# Patient Record
(demographics unavailable — no encounter records)

---

## 2024-11-04 NOTE — REASON FOR VISIT
[Patient preference] : as per patient preference [Continuing, patient seen in-person within last 12 months] : Telehealth services are continuing as patient has been seen in-person within last 12 months. [Telehealth (audio & video) - Individual/Group] : This visit was provided via telehealth using real-time 2-way audio visual technology. [Medical Office: ([facility])___] : The provider was located at the medical office in [unfilled]. [Home] : The patient, [unfilled], was located at home, [unfilled], at the time of the visit. [Participant(s) identity verified] : Participant(s) identity verified. [Verbal consent obtained from patient/other participant(s)] : Verbal consent for telehealth/telephonic services obtained from patient/other participant(s) [Patient] : Patient [FreeTextEntry4] : 9:30am

## 2024-11-04 NOTE — PLAN
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  On initial assessment, the differential includes MDD, GRETA with panic attacks, r/o PTSD. Plan to increase Pristiq to 100mg daily, continue trazodone 150mg at bedtime, Klonopin 0.5mg daily as needed (takes 1-2 days per week). Offered therapy referral resources.  On follow up assessments: 11/4/2024: Partial improvement in depressive symptoms with Pristiq increase, plan to increase to 150mg daily. Continue Klonopin 0.5mg daily as needed, trazodone taking 75mg at bedtime lately (half of 150mg). Decreased caffeine and EtOH use during the interval.  Labs: 9/25/2023: A1c 5.7% H, vit B12 serum 361 wnl, chol 223 H, trig 130 wnl, HDL 69 wnl, LDL calc 131 H, TSH 1.20 wnl 4/2/2024: CMP wnl, TSH 1.08 wnl 6/10/2024: CBC wnl, CMP wnl  PLAN -Discussed the diagnosis, treatment, alternatives to recommended treatment, risk Vs benefits of treatment and no treatment and alternative treatments. -Lab/other tests: advised to continue to check annual bloodwork with PCP -Medication: Increase Pristiq to 150mg daily, continue trazodone 75-150mg nightly, Klonopin 0.5mg as needed.  Desvenlafaxine side effects including but not limited to GI side effects, dry mouth, constipation, sweating, increased BP, urinary retention, sedation, headaches, dizziness, serotonin syndrome, hyponatremia, QT prolongation, weight gain, decreased libido, and black box warning of SI in patients younger than 24 were discussed. Trazodone side effects including but not limited to sedation, dry mouth constipation, headache, serotonin syndrome, black box warning increased suicidal thinking, prolonged QT interval, orthostatic hypotension and priapism discussed. Klonopin side effects: Discussed with patient adverse effects of longer term/frequent use of BZD, potential to develop tolerance to the dose effects and develop dependence, and potential for addiction. Advise patient not to drive or operate heavy machinery immediately after taking the medication. Also educated patient about safe use/and keeping meds safely, and to not share medications with family/friends. Advised not to combine BZD with EtOH. -Discussed recommendation for aerobic exercise -Discussed sleep hygiene -Educated patient of importance of remaining abstinent from drugs and alcohol. -Emergency procedures were discussed: pt. educated to call 911 or go to nearest ER for worsening of symptoms/suicidal/homicidal ideation. -Referred for individual psychotherapy to learn coping skills -RTC in 3-4 weeks or earlier as needed -Patient given opportunity to ask questions and their questions were answered and they expressed understanding and agreement with above plan.  ISTOP Reference #: [id number]  Practitioner Count: 0 Pharmacy Count: 0 Current Opioid Prescriptions: 0 Current Benzodiazepine Prescriptions: 0 Current Stimulant Prescriptions: 0   Patient Demographic Information (PDI)     PDI	First Name	Last Name	Birth Date	Gender	Street Address	[facility] A	Paulo Li	03/30/1987	Male	[address]	[facility]	[address] B	Paulo Li	03/30/1987	Male	[address]	[facility]	[address]  Prescription Information    PDI Filter:   PDI	My Rx	Current Rx	Drug Type	Rx Written	Rx Dispensed	Drug	Quantity	Days Supply	Prescriber Name	Prescriber JAMES #	Payment Method	Dispenser B	N	N	O	07/09/2024	07/10/2024	tramadol hcl 50 mg tablet	16	4	MEMO Tay Juan C	[id number]	Insurance	[id number] B	N	N	O	04/16/2024	04/17/2024	tramadol hcl 50 mg tablet	15	3	MEMO Tay Juan C	[id number]	Insurance	[id number] B	N	N	B	03/21/2024	04/02/2024	alprazolam 0.25 mg tablet	30	15	Mabel Fountain	[id number]	Insurance	[id number] B	N	N	B	02/27/2024	02/28/2024	alprazolam 0.25 mg tablet	30	15	Sloan Vaughn	[id number]	[id number] A	N	N	B	11/01/2023	11/09/2023	alprazolam 0.25 mg tablet	30	15	Sloan Vaughn	[id number]	[id number]

## 2024-11-04 NOTE — PHYSICAL EXAM
[None] : none [Cooperative] : cooperative [Depressed] : depressed [Full] : full [Clear] : clear [Linear/Goal Directed] : linear/goal directed [Average] : average [WNL] : within normal limits [de-identified] : anxious, dysphoric though less irritable, congruent with stated mood [FreeTextEntry7] : denies suicidal ideation/intent/plan or non-suicidal self-injurious ideation/intent/plan or homicidal ideation/intent/plan

## 2024-11-04 NOTE — PLAN
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  On initial assessment, the differential includes MDD, GRETA with panic attacks, r/o PTSD. Plan to increase Pristiq to 100mg daily, continue trazodone 150mg at bedtime, Klonopin 0.5mg daily as needed (takes 1-2 days per week). Offered therapy referral resources.  On follow up assessments: 11/4/2024: Partial improvement in depressive symptoms with Pristiq increase, plan to increase to 150mg daily. Continue Klonopin 0.5mg daily as needed, trazodone taking 75mg at bedtime lately (half of 150mg). Decreased caffeine and EtOH use during the interval.  Labs: 9/25/2023: A1c 5.7% H, vit B12 serum 361 wnl, chol 223 H, trig 130 wnl, HDL 69 wnl, LDL calc 131 H, TSH 1.20 wnl 4/2/2024: CMP wnl, TSH 1.08 wnl 6/10/2024: CBC wnl, CMP wnl  PLAN -Discussed the diagnosis, treatment, alternatives to recommended treatment, risk Vs benefits of treatment and no treatment and alternative treatments. -Lab/other tests: advised to continue to check annual bloodwork with PCP -Medication: Increase Pristiq to 150mg daily, continue trazodone 75-150mg nightly, Klonopin 0.5mg as needed.  Desvenlafaxine side effects including but not limited to GI side effects, dry mouth, constipation, sweating, increased BP, urinary retention, sedation, headaches, dizziness, serotonin syndrome, hyponatremia, QT prolongation, weight gain, decreased libido, and black box warning of SI in patients younger than 24 were discussed. Trazodone side effects including but not limited to sedation, dry mouth constipation, headache, serotonin syndrome, black box warning increased suicidal thinking, prolonged QT interval, orthostatic hypotension and priapism discussed. Klonopin side effects: Discussed with patient adverse effects of longer term/frequent use of BZD, potential to develop tolerance to the dose effects and develop dependence, and potential for addiction. Advise patient not to drive or operate heavy machinery immediately after taking the medication. Also educated patient about safe use/and keeping meds safely, and to not share medications with family/friends. Advised not to combine BZD with EtOH. -Discussed recommendation for aerobic exercise -Discussed sleep hygiene -Educated patient of importance of remaining abstinent from drugs and alcohol. -Emergency procedures were discussed: pt. educated to call 911 or go to nearest ER for worsening of symptoms/suicidal/homicidal ideation. -Referred for individual psychotherapy to learn coping skills -RTC in 3-4 weeks or earlier as needed -Patient given opportunity to ask questions and their questions were answered and they expressed understanding and agreement with above plan.  ISTOP Reference #: [id number]  Practitioner Count: 0 Pharmacy Count: 0 Current Opioid Prescriptions: 0 Current Benzodiazepine Prescriptions: 0 Current Stimulant Prescriptions: 0   Patient Demographic Information (PDI)     PDI	First Name	Last Name	Birth Date	Gender	Street Address	[facility] A	Paulo Li	03/30/1987	Male	[address]	[facility]	[address] B	Paulo Li	03/30/1987	Male	[address]	[facility]	[address]  Prescription Information    PDI Filter:   PDI	My Rx	Current Rx	Drug Type	Rx Written	Rx Dispensed	Drug	Quantity	Days Supply	Prescriber Name	Prescriber JAMES #	Payment Method	Dispenser B	N	N	O	07/09/2024	07/10/2024	tramadol hcl 50 mg tablet	16	4	MEMO Tay Juan C	[id number]	Insurance	[id number] B	N	N	O	04/16/2024	04/17/2024	tramadol hcl 50 mg tablet	15	3	MEMO Tay Juan C	[id number]	Insurance	[id number] B	N	N	B	03/21/2024	04/02/2024	alprazolam 0.25 mg tablet	30	15	Mabel Fountain	[id number]	Insurance	[id number] B	N	N	B	02/27/2024	02/28/2024	alprazolam 0.25 mg tablet	30	15	Sloan Vaughn	[id number]	[id number] A	N	N	B	11/01/2023	11/09/2023	alprazolam 0.25 mg tablet	30	15	Sloan Vaughn	[id number]	[id number]

## 2024-11-04 NOTE — PHYSICAL EXAM
[None] : none [Cooperative] : cooperative [Depressed] : depressed [Full] : full [Clear] : clear [Linear/Goal Directed] : linear/goal directed [Average] : average [WNL] : within normal limits [de-identified] : anxious, dysphoric though less irritable, congruent with stated mood [FreeTextEntry7] : denies suicidal ideation/intent/plan or non-suicidal self-injurious ideation/intent/plan or homicidal ideation/intent/plan

## 2024-11-04 NOTE — HISTORY OF PRESENT ILLNESS
[FreeTextEntry1] : Social: working from home today, decreased caffeine and alcohol use. ISTOP reviewed.  Medications: Pristiq 100mg: feels this is a better dose than 50mg but could be better still, plan to increase to 150mg daily dose. Trazodone 150mg: taking half (75mg), last night did not sleep well.  Mood: "better" though still sometimes has negative thoughts Anxiety: decreased, though still present, gets short-tempered Panic attacks: one yesterday, "small little episode" with shaking Motivation: fair Anhedonia: endorses Appetite: good Sleep: trazodone helps, taking 75mg lately. Multiple interruptions once or twice a week. Energy: fair Focus: could be better Guilt/worthlessness: endorses Thoughts of death: endorses thoughts of death without fully formulated plan or intent Thoughts of harming self: denies Thoughts of harming others: denies  EtOH use: 1-2 beers once per week Drug use: denies Tobacco use: denies Caffeine use: 1 cup every 2 days

## 2024-11-04 NOTE — REASON FOR VISIT
[Patient preference] : as per patient preference [Continuing, patient seen in-person within last 12 months] : Telehealth services are continuing as patient has been seen in-person within last 12 months. [Telehealth (audio & video) - Individual/Group] : This visit was provided via telehealth using real-time 2-way audio visual technology. [Medical Office: ([facility])___] : The provider was located at the medical office in [unfilled]. [Home] : The patient, [unfilled], was located at home, [unfilled], at the time of the visit. [Participant(s) identity verified] : Participant(s) identity verified. [Verbal consent obtained from patient/other participant(s)] : Verbal consent for telehealth/telephonic services obtained from patient/other participant(s) [Patient] : Patient [FreeTextEntry4] : 9:30am

## 2024-11-19 NOTE — PLAN
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  On initial assessment, the differential includes MDD, GRETA with panic attacks, r/o PTSD. Plan to increase Pristiq to 100mg daily, continue trazodone 150mg at bedtime, Klonopin 0.5mg daily as needed (takes 1-2 days per week). Offered therapy referral resources.  On follow up assessments: 11/4/2024: Partial improvement in depressive symptoms with Pristiq increase, plan to increase to 150mg daily. Continue Klonopin 0.5mg daily as needed, trazodone taking 75mg at bedtime lately (half of 150mg). Decreased caffeine and EtOH use during the interval. 11/19/2024: discussed possibility to explore an ADHD diagnosis at the next visit. Ongoing depressive symptoms and anxiety that is worse at night, plan to continue Pristiq to 150mg daily (pt declined a dose increase at this time since he wants to give it longer on this dose), continue trazodone 75-150mg nightly, Klonopin 0.5mg as needed.  Labs: 9/25/2023: A1c 5.7% H, vit B12 serum 361 wnl, chol 223 H, trig 130 wnl, HDL 69 wnl, LDL calc 131 H, TSH 1.20 wnl 4/2/2024: CMP wnl, TSH 1.08 wnl 6/10/2024: CBC wnl, CMP wnl  PLAN -Discussed the diagnosis, treatment, alternatives to recommended treatment, risk Vs benefits of treatment and no treatment and alternative treatments. -Lab/other tests: advised to continue to check annual bloodwork with PCP -Medication: continue Pristiq to 150mg daily, continue trazodone 75-150mg nightly, Klonopin 0.5mg as needed.  Desvenlafaxine side effects including but not limited to GI side effects, dry mouth, constipation, sweating, increased BP, urinary retention, sedation, headaches, dizziness, serotonin syndrome, hyponatremia, QT prolongation, weight gain, decreased libido, and black box warning of SI in patients younger than 24 were discussed. Trazodone side effects including but not limited to sedation, dry mouth constipation, headache, serotonin syndrome, black box warning increased suicidal thinking, prolonged QT interval, orthostatic hypotension and priapism discussed. Klonopin side effects: Discussed with patient adverse effects of longer term/frequent use of BZD, potential to develop tolerance to the dose effects and develop dependence, and potential for addiction. Advise patient not to drive or operate heavy machinery immediately after taking the medication. Also educated patient about safe use/and keeping meds safely, and to not share medications with family/friends. Advised not to combine BZD with EtOH. -Discussed recommendation for aerobic exercise -Discussed sleep hygiene -Educated patient of importance of remaining abstinent from drugs and alcohol. -Emergency procedures were discussed: pt. educated to call 911 or go to nearest ER for worsening of symptoms/suicidal/homicidal ideation. -Referred for individual psychotherapy to learn coping skills -RTC in 2 weeks or earlier as needed -Patient given opportunity to ask questions and their questions were answered and they expressed understanding and agreement with above plan.  ISTOP Reference #: [id number]  Practitioner Count: 0 Pharmacy Count: 0 Current Opioid Prescriptions: 0 Current Benzodiazepine Prescriptions: 0 Current Stimulant Prescriptions: 0   Patient Demographic Information (PDI)     PDI	First Name	Last Name	Birth Date	Gender	Street Address	[facility] Code ANJELICA Li	03/30/1987	Male	[address]	[facility]	[address]  Prescription Information    PDI Filter:   PDI	My Rx	Current Rx	Drug Type	Rx Written	Rx Dispensed	Drug	Quantity	Days Supply	Prescriber Name	Prescriber JAMES #	Payment Method	Dispenser A	N	N	O	07/09/2024	07/10/2024	tramadol hcl 50 mg tablet	16	4	MEMO Tay Juan C	[id number]	[id number] A	N	N	O	04/16/2024	04/17/2024	tramadol hcl 50 mg tablet	15	3	MEMO Tay Juan C	[id number]	Insurance	[id number] A	N	N	B	03/21/2024	04/02/2024	alprazolam 0.25 mg tablet	30	15	Mabel Fountain	[id number]	Insurance	[id number] A	N	N	B	02/27/2024	02/28/2024	alprazolam 0.25 mg tablet	30	15	Sloan Vaughn	[id number]	Insurance	[id number]

## 2024-11-19 NOTE — PHYSICAL EXAM
[None] : none [Cooperative] : cooperative [Depressed] : depressed [Anxious] : anxious [Full] : full [Clear] : clear [Linear/Goal Directed] : linear/goal directed [Average] : average [WNL] : within normal limits [de-identified] : less anxious, less dysphoric, congruent with stated mood [FreeTextEntry7] : passive thoughts of death without intent or plan, denies non-suicidal self-injurious ideation/intent/plan or homicidal ideation/intent/plan

## 2024-11-19 NOTE — PLAN
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  On initial assessment, the differential includes MDD, GRETA with panic attacks, r/o PTSD. Plan to increase Pristiq to 100mg daily, continue trazodone 150mg at bedtime, Klonopin 0.5mg daily as needed (takes 1-2 days per week). Offered therapy referral resources.  On follow up assessments: 11/4/2024: Partial improvement in depressive symptoms with Pristiq increase, plan to increase to 150mg daily. Continue Klonopin 0.5mg daily as needed, trazodone taking 75mg at bedtime lately (half of 150mg). Decreased caffeine and EtOH use during the interval. 11/19/2024: discussed possibility to explore an ADHD diagnosis at the next visit. Ongoing depressive symptoms and anxiety that is worse at night, plan to continue Pristiq to 150mg daily (pt declined a dose increase at this time since he wants to give it longer on this dose), continue trazodone 75-150mg nightly, Klonopin 0.5mg as needed.  Labs: 9/25/2023: A1c 5.7% H, vit B12 serum 361 wnl, chol 223 H, trig 130 wnl, HDL 69 wnl, LDL calc 131 H, TSH 1.20 wnl 4/2/2024: CMP wnl, TSH 1.08 wnl 6/10/2024: CBC wnl, CMP wnl  PLAN -Discussed the diagnosis, treatment, alternatives to recommended treatment, risk Vs benefits of treatment and no treatment and alternative treatments. -Lab/other tests: advised to continue to check annual bloodwork with PCP -Medication: continue Pristiq to 150mg daily, continue trazodone 75-150mg nightly, Klonopin 0.5mg as needed.  Desvenlafaxine side effects including but not limited to GI side effects, dry mouth, constipation, sweating, increased BP, urinary retention, sedation, headaches, dizziness, serotonin syndrome, hyponatremia, QT prolongation, weight gain, decreased libido, and black box warning of SI in patients younger than 24 were discussed. Trazodone side effects including but not limited to sedation, dry mouth constipation, headache, serotonin syndrome, black box warning increased suicidal thinking, prolonged QT interval, orthostatic hypotension and priapism discussed. Klonopin side effects: Discussed with patient adverse effects of longer term/frequent use of BZD, potential to develop tolerance to the dose effects and develop dependence, and potential for addiction. Advise patient not to drive or operate heavy machinery immediately after taking the medication. Also educated patient about safe use/and keeping meds safely, and to not share medications with family/friends. Advised not to combine BZD with EtOH. -Discussed recommendation for aerobic exercise -Discussed sleep hygiene -Educated patient of importance of remaining abstinent from drugs and alcohol. -Emergency procedures were discussed: pt. educated to call 911 or go to nearest ER for worsening of symptoms/suicidal/homicidal ideation. -Referred for individual psychotherapy to learn coping skills -RTC in 2 weeks or earlier as needed -Patient given opportunity to ask questions and their questions were answered and they expressed understanding and agreement with above plan.  ISTOP Reference #: [id number]  Practitioner Count: 0 Pharmacy Count: 0 Current Opioid Prescriptions: 0 Current Benzodiazepine Prescriptions: 0 Current Stimulant Prescriptions: 0   Patient Demographic Information (PDI)     PDI	First Name	Last Name	Birth Date	Gender	Street Address	[facility] Code ANJELICA iL	03/30/1987	Male	[address]	[facility]	[address]  Prescription Information    PDI Filter:   PDI	My Rx	Current Rx	Drug Type	Rx Written	Rx Dispensed	Drug	Quantity	Days Supply	Prescriber Name	Prescriber JAMES #	Payment Method	Dispenser A	N	N	O	07/09/2024	07/10/2024	tramadol hcl 50 mg tablet	16	4	MEMO Tay Juan C	[id number]	[id number] A	N	N	O	04/16/2024	04/17/2024	tramadol hcl 50 mg tablet	15	3	MEMO Tay Juan C	[id number]	Insurance	[id number] A	N	N	B	03/21/2024	04/02/2024	alprazolam 0.25 mg tablet	30	15	Mabel Fountain	[id number]	Insurance	[id number] A	N	N	B	02/27/2024	02/28/2024	alprazolam 0.25 mg tablet	30	15	Sloan Vaughn	[id number]	Insurance	[id number]

## 2024-11-19 NOTE — REASON FOR VISIT
[Patient preference] : as per patient preference [Continuing, patient seen in-person within last 12 months] : Telehealth services are continuing as patient has been seen in-person within last 12 months. [Telehealth (audio & video) - Individual/Group] : This visit was provided via telehealth using real-time 2-way audio visual technology. [Participant(s) identity verified] : Participant(s) identity verified. [Verbal consent obtained from patient/other participant(s)] : Verbal consent for telehealth/telephonic services obtained from patient/other participant(s) [Patient] : Patient [Other Location: e.g. Home (Enter Location, City,State)___] : The patient, [unfilled], was located at [unfilled] at the time of the visit. [FreeTextEntry4] : 4:30pm

## 2024-11-19 NOTE — PHYSICAL EXAM
[None] : none [Cooperative] : cooperative [Depressed] : depressed [Anxious] : anxious [Full] : full [Clear] : clear [Linear/Goal Directed] : linear/goal directed [Average] : average [WNL] : within normal limits [de-identified] : less anxious, less dysphoric, congruent with stated mood [FreeTextEntry7] : passive thoughts of death without intent or plan, denies non-suicidal self-injurious ideation/intent/plan or homicidal ideation/intent/plan

## 2024-11-19 NOTE — HISTORY OF PRESENT ILLNESS
[FreeTextEntry1] : Social: Saw a neurologist for memory issues. Has difficulty with focus and concentration. ISTOP reviewed.  Medications: Pristiq 150mg: has not noticed benefit yet, wants to continue on this dose Trazodone 75mg (half of 150mg): Klonopin 0.5mg as needed: took on Sunday.  Mood: "anxious"  Anxiety: worsens at night Panic attacks: one last week Motivation: fair Anhedonia: denies, enjoys watching his kids play Appetite: "fine" Sleep: multiple interruptions Energy: fair Focus: impaired Guilt/worthlessness: endorses Thoughts of death: passive thoughts of death without intent or plan, wants to live for his children. Thoughts of harming self: denies Thoughts of harming others: denies  EtOH use: 4 beers on  Friday Drug use: denies Tobacco use: denies Caffeine use: less than 1 cup every 2 days
[FreeTextEntry1] : Social: Saw a neurologist for memory issues. Has difficulty with focus and concentration. ISTOP reviewed.  Medications: Pristiq 150mg: has not noticed benefit yet, wants to continue on this dose Trazodone 75mg (half of 150mg): Klonopin 0.5mg as needed: took on Sunday.  Mood: "anxious"  Anxiety: worsens at night Panic attacks: one last week Motivation: fair Anhedonia: denies, enjoys watching his kids play Appetite: "fine" Sleep: multiple interruptions Energy: fair Focus: impaired Guilt/worthlessness: endorses Thoughts of death: passive thoughts of death without intent or plan, wants to live for his children. Thoughts of harming self: denies Thoughts of harming others: denies  EtOH use: 4 beers on  Friday Drug use: denies Tobacco use: denies Caffeine use: less than 1 cup every 2 days
Alert-The patient is alert, awake and responds to voice. The patient is oriented to time, place, and person. The triage nurse is able to obtain subjective information.

## 2024-11-27 NOTE — HISTORY OF PRESENT ILLNESS
[de-identified] : 37 year old male with h/o hyperlipidemia, s/p bariatric surgery, Barett's Esoghatis, GERD, anxiety, insomnia presents for annual exam.  plan for revision of left ankle surgery as still having pain issues with penile irritation while having intercourse, started after circumcision.   needs to follow-up with urologist  Has been doing well on Zepbound.  Has noticed decreased cravings. will sánchez to stop medication in near future  Goal weight-145, concerned he will gain weight back after stopping medication as what happened following bariatric surgery  diet- home cooking mostly.  no juices or energy drinks.  craved soda after the bariatric surgery.   exercise- 5x week, mostly elliptical.    , 2 children  employed  non-smoker

## 2024-11-27 NOTE — HEALTH RISK ASSESSMENT
[Fair] : ~his/her~ current health as fair  [Good] : ~his/her~  mood as  good [No falls in past year] : Patient reported no falls in the past year [No] : In the past 12 months have you used drugs other than those required for medical reasons? No [0] : 2) Feeling down, depressed, or hopeless: Not at all (0) [PHQ-2 Negative - No further assessment needed] : PHQ-2 Negative - No further assessment needed [Never] : Never [[id number]] : 0 [Change in mental status noted] : No change in mental status noted [] :  [Fully functional (bathing, dressing, toileting, transferring, walking, feeding)] : Fully functional (bathing, dressing, toileting, transferring, walking, feeding) [Fully functional (using the telephone, shopping, preparing meals, housekeeping, doing laundry, using] : Fully functional and needs no help or supervision to perform IADLs (using the telephone, shopping, preparing meals, housekeeping, doing laundry, using transportation, managing medications and managing finances) [Reports changes in hearing] : Reports no changes in hearing [Reports changes in vision] : Reports no changes in vision

## 2024-11-27 NOTE — ASSESSMENT
The patient's goals for the shift include      The clinical goals for the shift include patient will remain free from falls     [FreeTextEntry1] : //  h/o obesity s/p bariatric surgery, now with increased food cravings, would like o start weight loss meds as concerned about future weight gain.   -continue zepbound look to dc in near future as weight at goal  -A safe and healthy way to lose weight is to eat fewer calories and get regular exercise. You can lose up about 1 pound a week by decreasing the number of calories you eat by 500 calories each day. You can decrease calories by eating smaller portion sizes or by cutting out high-calorie foods. Read labels to find out how many calories are in the foods you eat. You can also burn calories with exercise such as walking, swimming, or biking. You will be more likely to keep weight off if you make these changes part of your lifestyle. -Exercise at least 30 minutes per day on most days of the week. Some examples of exercise include walking, biking, dancing, and swimming. You can also fit in more physical activity by taking the stairs instead of the elevator or parking farther away from stores.  Barretts Esophagus, s/p bariatric surgery -continue PPI  -f/u as per GI  -Don't overeat. Eat five or six small meals each day, instead of several large meals. Don't eat before bedtime. Allow 2 hours to digest your food before lying down. This allows time for the food to pass out of the stomach and into the small intestine, rather than having it back up into the esophagus. Lying down makes digestion difficult and makes heartburn more likely. -Several foods are known to lead to GERD include: Alcohol, particularly red wine, Black pepper, garlic, raw onions, and other spicy foods, chocolate, citrus fruits and products, such as mckenna, oranges and orange juice, coffee and caffeinated drinks, including tea and soda, peppermint, tomatoes -However, unless these foods are causing you heartburn you don't have to avoid them. -Several medications are known lead to GERD include-regular use of aspirin, NSAIDs include ibuprofen, naproxen, Celebrex, Sedatives, Narcotic painkillers, Progesterone, a hormone found in some birth control pills, iron and potassium.  Hyperlipidemia, LDL >100 -continue zocor  -Will check labs  -Advised decrease greasy, fatty foods, increase exercise, fiber intake  -Elevated cholesterol has been linked to increase in cardiovascular even such as heart attack, stroke, peripheral artery disease and death  vitamin b12 def  -continue supplements   vitamin d -continue supplements  Migraine -controlled  -imetrx prn   Anxiety/depression  -f/u as per psych  -continue desvenlafaxine, as directed by psych  -encouraged relaxation, tech, deep breathing exercises, yoga, acupuncture -follow-up with therapist as directed   Insomnia -continue trazadone as discussed  -Go to bed and get up at the same time every day-Do not try to force yourself to sleep. If you can't sleep, get out of bed and try again later. -Have coffee, tea, and other foods that have caffeine only in the morning -Avoid alcohol in the late afternoon, evening, and bedtime -Keep your bedroom dark, cool, quiet, and free of reminders of work or other things that cause you stress -Solve problems you have before you go to bed-Exercise several days a week, but not right before bed -Avoid looking at phones or reading devices ("e-books") that give off light before bed. This can make it harder to fall asleep.  Healthcare Maintenance -Advise Yearly Skin cancer screening with Dermatologist  -Advise Yearly Eye exam with Ophthalmologist -Advise Yearly Dental exam -Educated of the importance of Healthy diet, such as Mediterranean Diet and Exercise, such as walking >20 minutes a day and increasing gradually as tolerated  Immunizations -Flu vaccine  -Covid vaccine  -Discussed shingles vaccine (shingrix), advised to verify with insurance if its covered through medical or prescription plan

## 2024-11-27 NOTE — HEALTH RISK ASSESSMENT
Patient back from xray via [place] with tech.   [Fair] : ~his/her~ current health as fair  [Good] : ~his/her~  mood as  good [No] : In the past 12 months have you used drugs other than those required for medical reasons? No [No falls in past year] : Patient reported no falls in the past year [0] : 2) Feeling down, depressed, or hopeless: Not at all (0) [PHQ-2 Negative - No further assessment needed] : PHQ-2 Negative - No further assessment needed [Never] : Never [[id number]] : 0 [Change in mental status noted] : No change in mental status noted [] :  [Fully functional (bathing, dressing, toileting, transferring, walking, feeding)] : Fully functional (bathing, dressing, toileting, transferring, walking, feeding) [Fully functional (using the telephone, shopping, preparing meals, housekeeping, doing laundry, using] : Fully functional and needs no help or supervision to perform IADLs (using the telephone, shopping, preparing meals, housekeeping, doing laundry, using transportation, managing medications and managing finances) [Reports changes in hearing] : Reports no changes in hearing [Reports changes in vision] : Reports no changes in vision

## 2024-11-27 NOTE — HISTORY OF PRESENT ILLNESS
[de-identified] : 37 year old male with h/o hyperlipidemia, s/p bariatric surgery, Barett's Esoghatis, GERD, anxiety, insomnia presents for annual exam.  plan for revision of left ankle surgery as still having pain issues with penile irritation while having intercourse, started after circumcision.   needs to follow-up with urologist  Has been doing well on Zepbound.  Has noticed decreased cravings. will sánchez to stop medication in near future  Goal weight-145, concerned he will gain weight back after stopping medication as what happened following bariatric surgery  diet- home cooking mostly.  no juices or energy drinks.  craved soda after the bariatric surgery.   exercise- 5x week, mostly elliptical.    , 2 children  employed  non-smoker

## 2024-11-27 NOTE — ASSESSMENT
[FreeTextEntry1] : //  h/o obesity s/p bariatric surgery, now with increased food cravings, would like o start weight loss meds as concerned about future weight gain.   -continue zepbound look to dc in near future as weight at goal  -A safe and healthy way to lose weight is to eat fewer calories and get regular exercise. You can lose up about 1 pound a week by decreasing the number of calories you eat by 500 calories each day. You can decrease calories by eating smaller portion sizes or by cutting out high-calorie foods. Read labels to find out how many calories are in the foods you eat. You can also burn calories with exercise such as walking, swimming, or biking. You will be more likely to keep weight off if you make these changes part of your lifestyle. -Exercise at least 30 minutes per day on most days of the week. Some examples of exercise include walking, biking, dancing, and swimming. You can also fit in more physical activity by taking the stairs instead of the elevator or parking farther away from stores.  Barretts Esophagus, s/p bariatric surgery -continue PPI  -f/u as per GI  -Don't overeat. Eat five or six small meals each day, instead of several large meals. Don't eat before bedtime. Allow 2 hours to digest your food before lying down. This allows time for the food to pass out of the stomach and into the small intestine, rather than having it back up into the esophagus. Lying down makes digestion difficult and makes heartburn more likely. -Several foods are known to lead to GERD include: Alcohol, particularly red wine, Black pepper, garlic, raw onions, and other spicy foods, chocolate, citrus fruits and products, such as mckenna, oranges and orange juice, coffee and caffeinated drinks, including tea and soda, peppermint, tomatoes -However, unless these foods are causing you heartburn you don't have to avoid them. -Several medications are known lead to GERD include-regular use of aspirin, NSAIDs include ibuprofen, naproxen, Celebrex, Sedatives, Narcotic painkillers, Progesterone, a hormone found in some birth control pills, iron and potassium.  Hyperlipidemia, LDL >100 -continue zocor  -Will check labs  -Advised decrease greasy, fatty foods, increase exercise, fiber intake  -Elevated cholesterol has been linked to increase in cardiovascular even such as heart attack, stroke, peripheral artery disease and death  vitamin b12 def  -continue supplements   vitamin d -continue supplements  Migraine -controlled  -imetrx prn   Anxiety/depression  -f/u as per psych  -continue desvenlafaxine, as directed by psych  -encouraged relaxation, tech, deep breathing exercises, yoga, acupuncture -follow-up with therapist as directed   Insomnia -continue trazadone as discussed  -Go to bed and get up at the same time every day-Do not try to force yourself to sleep. If you can't sleep, get out of bed and try again later. -Have coffee, tea, and other foods that have caffeine only in the morning -Avoid alcohol in the late afternoon, evening, and bedtime -Keep your bedroom dark, cool, quiet, and free of reminders of work or other things that cause you stress -Solve problems you have before you go to bed-Exercise several days a week, but not right before bed -Avoid looking at phones or reading devices ("e-books") that give off light before bed. This can make it harder to fall asleep.  Healthcare Maintenance -Advise Yearly Skin cancer screening with Dermatologist  -Advise Yearly Eye exam with Ophthalmologist -Advise Yearly Dental exam -Educated of the importance of Healthy diet, such as Mediterranean Diet and Exercise, such as walking >20 minutes a day and increasing gradually as tolerated  Immunizations -Flu vaccine  -Covid vaccine  -Discussed shingles vaccine (shingrix), advised to verify with insurance if its covered through medical or prescription plan

## 2024-11-27 NOTE — PHYSICAL EXAM
[Normal] : normal rate, regular rhythm, normal S1 and S2 and no murmur heard [Pedal Pulses Present] : the pedal pulses are present [No Edema] : there was no peripheral edema [No Extremity Clubbing/Cyanosis] : no extremity clubbing/cyanosis [Soft] : abdomen soft [Non Tender] : non-tender [Non-distended] : non-distended [No HSM] : no HSM [Penis Abnormality] : normal circumcised penis [Normal Posterior Cervical Nodes] : no posterior cervical lymphadenopathy [Normal Anterior Cervical Nodes] : no anterior cervical lymphadenopathy [No CVA Tenderness] : no CVA  tenderness [No Joint Swelling] : no joint swelling [No Rash] : no rash [No Focal Deficits] : no focal deficits [Normal Affect] : the affect was normal [Normal Mood] : the mood was normal

## 2025-01-29 NOTE — ASSESSMENT
[Patient Optimized for Surgery] : Patient optimized for surgery [No Further Testing Recommended] : no further testing recommended [As per surgery] : as per surgery [FreeTextEntry4] : Patient is moderate candidate undergoing moderate risk procedure.  No absolute contraindication therefore medically optimized and cleared for procedure.

## 2025-01-29 NOTE — HISTORY OF PRESENT ILLNESS
[No Pertinent Cardiac History] : no history of aortic stenosis, atrial fibrillation, coronary artery disease, recent myocardial infarction, or implantable device/pacemaker [(Patient denies any chest pain, claudication, dyspnea on exertion, orthopnea, palpitations or syncope)] : Patient denies any chest pain, claudication, dyspnea on exertion, orthopnea, palpitations or syncope [FreeTextEntry1] : excess skin removal  [FreeTextEntry2] : 2/7/2025 [FreeTextEntry3] : Rommel Murillo  [FreeTextEntry4] : 37 year old male presents for pre-operative clearance.    Overall doing okay.  No acute issues. off zepbound for 2 weeks

## 2025-02-27 NOTE — PLAN
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  On initial assessment, the differential includes MDD, GRETA with panic attacks, r/o PTSD. Plan to increase Pristiq to 100mg daily, continue trazodone 150mg at bedtime, Klonopin 0.5mg daily as needed (takes 1-2 days per week). Offered therapy referral resources.  On follow up assessments: 11/4/2024: Partial improvement in depressive symptoms with Pristiq increase, plan to increase to 150mg daily. Continue Klonopin 0.5mg daily as needed, trazodone taking 75mg at bedtime lately (half of 150mg). Decreased caffeine and EtOH use during the interval. 11/19/2024: discussed possibility to explore an ADHD diagnosis at the next visit. Ongoing depressive symptoms and anxiety that is worse at night, plan to continue Pristiq to 150mg daily (pt declined a dose increase at this time since he wants to give it longer on this dose), continue trazodone 75-150mg nightly, Klonopin 0.5mg as needed. 2/27/2025: Pt's depression and anxiety are significant, suspect that depressive symptoms are contributing to poor focus rather than apparent ADHD though did not formally assess for ADHD on this visit. Pt is motivated for change and interested in therapy so writer offered an internal therapy referral. For now plan to continue Pristiq 150mg daily, trazodone 75mg at bedtime (can take 37.5mg as 1/4 of 150mg pill if coming home late), Klonopin 0.5mg as needed taking on weekends. MI provided on EtOH use cessation to improve sleep quality and mood.  Labs: 9/25/2023: A1c 5.7% H, vit B12 serum 361 wnl, chol 223 H, trig 130 wnl, HDL 69 wnl, LDL calc 131 H, TSH 1.20 wnl 4/2/2024: CMP wnl, TSH 1.08 wnl 6/10/2024: CBC wnl, CMP wnl  PLAN -Discussed the diagnosis, treatment, alternatives to recommended treatment, risk Vs benefits of treatment and no treatment and alternative treatments. -Lab/other tests: advised to continue to check annual bloodwork with PCP -Medication: continue Pristiq to 150mg daily, continue trazodone 37.5mg-150mg nightly, Klonopin 0.5mg as needed.  Desvenlafaxine side effects including but not limited to GI side effects, dry mouth, constipation, sweating, increased BP, urinary retention, sedation, headaches, dizziness, serotonin syndrome, hyponatremia, QT prolongation, weight gain, decreased libido, and black box warning of SI in patients younger than 24 were discussed. Trazodone side effects including but not limited to sedation, dry mouth constipation, headache, serotonin syndrome, black box warning increased suicidal thinking, prolonged QT interval, orthostatic hypotension and priapism discussed. Klonopin side effects: Discussed with patient adverse effects of longer term/frequent use of BZD, potential to develop tolerance to the dose effects and develop dependence, and potential for addiction. Advise patient not to drive or operate heavy machinery immediately after taking the medication. Also educated patient about safe use/and keeping meds safely, and to not share medications with family/friends. Advised not to combine BZD with EtOH. -Discussed recommendation for aerobic exercise -Discussed sleep hygiene -Educated patient of importance of remaining abstinent from drugs and alcohol. -Emergency procedures were discussed: pt. educated to call 911 or go to nearest ER for worsening of symptoms/suicidal/homicidal ideation. -Referred for individual psychotherapy to learn coping skills -Return to clinic in 4 weeks or earlier as needed -Patient given opportunity to ask questions and their questions were answered and they expressed understanding and agreement with above plan.  ISTOP Reference #: [id number]  Practitioner Count: 3 Pharmacy Count: 1 Current Opioid Prescriptions: 0 Current Benzodiazepine Prescriptions: 0 Current Stimulant Prescriptions: 0   Patient Demographic Information (PDI)     PDI	First Name	Last Name	Birth Date	Gender	Street Address	[facility]	Zip Code [place]	03/30/1987	Male	[address]	[facility]	[id number]  Prescription Information    PDI Filter:   PDI	My Rx	Current Rx	Drug Type	Rx Written	Rx Dispensed	Drug	Quantity	Days Supply	Prescriber Name	Prescriber JAMES #	Payment Method	Dispenser A	N	N	O	02/07/2025	02/07/2025	oxycodone hcl (ir) 5 mg tablet	36	5	Shannan Lockett	[id number]	Cash	[id number] A	N	N	O	12/17/2024	12/18/2024	oxycodone-acetaminophen 5-325 mg tab	28	7	Jasvir, MEMO, Jaspal CONNER	[id number]	Insurance	[id number] A	Y	N	B	12/03/2024	12/03/2024	clonazepam 0.5 mg tablet	30	30	Abdirashid Herrera	[id number]	Insurance	[id number] A	N	N	O	07/09/2024	07/10/2024	tramadol hcl 50 mg tablet	16	4	MEMO Tay, Jaspal CONNER	[id number]	Insurance	[id number] A	N	N	O	04/16/2024	04/17/2024	tramadol hcl 50 mg tablet	15	3	MEMO Tay, Jaspal CONNER	[id number]	Insurance	[id number] A	N	N	B	03/21/2024	04/02/2024	alprazolam 0.25 mg tablet	30	15	Mabel Fountain	[id number]	Insurance	[id number] A	N	N	B	02/27/2024	02/28/2024	alprazolam 0.25 mg tablet	30	15	Sloan Vaughn	[id number]	Insurance	[id number]

## 2025-02-27 NOTE — PHYSICAL EXAM
[None] : none [Cooperative] : cooperative [Full] : full [Clear] : clear [Linear/Goal Directed] : linear/goal directed [Average] : average [WNL] : within normal limits [FreeTextEntry8] :  "calm, mellow" [de-identified] : mildly dysphoric, congruent with stated mood [FreeTextEntry7] : passive thoughts of death without intent or plan, denies non-suicidal self-injurious ideation/intent/plan or homicidal ideation/intent/plan

## 2025-02-27 NOTE — PLAN
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  On initial assessment, the differential includes MDD, GRETA with panic attacks, r/o PTSD. Plan to increase Pristiq to 100mg daily, continue trazodone 150mg at bedtime, Klonopin 0.5mg daily as needed (takes 1-2 days per week). Offered therapy referral resources.  On follow up assessments: 11/4/2024: Partial improvement in depressive symptoms with Pristiq increase, plan to increase to 150mg daily. Continue Klonopin 0.5mg daily as needed, trazodone taking 75mg at bedtime lately (half of 150mg). Decreased caffeine and EtOH use during the interval. 11/19/2024: discussed possibility to explore an ADHD diagnosis at the next visit. Ongoing depressive symptoms and anxiety that is worse at night, plan to continue Pristiq to 150mg daily (pt declined a dose increase at this time since he wants to give it longer on this dose), continue trazodone 75-150mg nightly, Klonopin 0.5mg as needed. 2/27/2025: Pt's depression and anxiety are significant, suspect that depressive symptoms are contributing to poor focus rather than apparent ADHD though did not formally assess for ADHD on this visit. Pt is motivated for change and interested in therapy so writer offered an internal therapy referral. For now plan to continue Pristiq 150mg daily, trazodone 75mg at bedtime (can take 37.5mg as 1/4 of 150mg pill if coming home late), Klonopin 0.5mg as needed taking on weekends. MI provided on EtOH use cessation to improve sleep quality and mood.  Labs: 9/25/2023: A1c 5.7% H, vit B12 serum 361 wnl, chol 223 H, trig 130 wnl, HDL 69 wnl, LDL calc 131 H, TSH 1.20 wnl 4/2/2024: CMP wnl, TSH 1.08 wnl 6/10/2024: CBC wnl, CMP wnl  PLAN -Discussed the diagnosis, treatment, alternatives to recommended treatment, risk Vs benefits of treatment and no treatment and alternative treatments. -Lab/other tests: advised to continue to check annual bloodwork with PCP -Medication: continue Pristiq to 150mg daily, continue trazodone 37.5mg-150mg nightly, Klonopin 0.5mg as needed.  Desvenlafaxine side effects including but not limited to GI side effects, dry mouth, constipation, sweating, increased BP, urinary retention, sedation, headaches, dizziness, serotonin syndrome, hyponatremia, QT prolongation, weight gain, decreased libido, and black box warning of SI in patients younger than 24 were discussed. Trazodone side effects including but not limited to sedation, dry mouth constipation, headache, serotonin syndrome, black box warning increased suicidal thinking, prolonged QT interval, orthostatic hypotension and priapism discussed. Klonopin side effects: Discussed with patient adverse effects of longer term/frequent use of BZD, potential to develop tolerance to the dose effects and develop dependence, and potential for addiction. Advise patient not to drive or operate heavy machinery immediately after taking the medication. Also educated patient about safe use/and keeping meds safely, and to not share medications with family/friends. Advised not to combine BZD with EtOH. -Discussed recommendation for aerobic exercise -Discussed sleep hygiene -Educated patient of importance of remaining abstinent from drugs and alcohol. -Emergency procedures were discussed: pt. educated to call 911 or go to nearest ER for worsening of symptoms/suicidal/homicidal ideation. -Referred for individual psychotherapy to learn coping skills -Return to clinic in 4 weeks or earlier as needed -Patient given opportunity to ask questions and their questions were answered and they expressed understanding and agreement with above plan.  ISTOP Reference #: [id number]  Practitioner Count: 3 Pharmacy Count: 1 Current Opioid Prescriptions: 0 Current Benzodiazepine Prescriptions: 0 Current Stimulant Prescriptions: 0   Patient Demographic Information (PDI)     PDI	First Name	Last Name	Birth Date	Gender	Street Address	[facility]	Zip Code [place] Li	03/30/1987	Male	[address]	[place]	[address]  Prescription Information    PDI Filter:   PDI	My Rx	Current Rx	Drug Type	Rx Written	Rx Dispensed	Drug	Quantity	Days Supply	Prescriber Name	Prescriber JAMES #	Payment Method	Dispenser A	N	N	O	02/07/2025	02/07/2025	oxycodone hcl (ir) 5 mg tablet	36	5	Shannan Lockett	[id number]	Cash	[id number] A	N	N	O	12/17/2024	12/18/2024	oxycodone-acetaminophen 5-325 mg tab	28	7	Jasvir, MEMO, Jaspal CONNER	[id number]	Insurance	[id number] A	Y	N	B	12/03/2024	12/03/2024	clonazepam 0.5 mg tablet	30	30	Abdirashid Herrera	[id number]	Insurance	[id number] A	N	N	O	07/09/2024	07/10/2024	tramadol hcl 50 mg tablet	16	4	MEMO Tay, Jaspal CONNER	[id number]	Insurance	[id number] A	N	N	O	04/16/2024	04/17/2024	tramadol hcl 50 mg tablet	15	3	MEMO Tay, Jaspal CONNER	[id number]	Insurance	[id number] A	N	N	B	03/21/2024	04/02/2024	alprazolam 0.25 mg tablet	30	15	Mabel Fountain	[id number]	Insurance	[id number] A	N	N	B	02/27/2024	02/28/2024	alprazolam 0.25 mg tablet	30	15	Sloan Vaughn	[id number]	Insurance	[id number]

## 2025-02-27 NOTE — PHYSICAL EXAM
[None] : none [Cooperative] : cooperative [Full] : full [Clear] : clear [Linear/Goal Directed] : linear/goal directed [Average] : average [WNL] : within normal limits [FreeTextEntry8] :  "calm, mellow" [de-identified] : mildly dysphoric, congruent with stated mood [FreeTextEntry7] : passive thoughts of death without intent or plan, denies non-suicidal self-injurious ideation/intent/plan or homicidal ideation/intent/plan

## 2025-02-27 NOTE — HISTORY OF PRESENT ILLNESS
[FreeTextEntry1] : Social: Pt still feels poorly when he gets home. He has two drinks every other night or so then gets home but wants to be home after his family is asleep. He realizes that coming home late and being tired impacts him the next day, but he keeps doing it. ISTOP reviewed.  Medications: Pristiq 150mg: endorses adherence, no side effects elicited Trazodone 75mg: taking 3-4 nights a week because coming home late and does not want to take it too late. Discussed taking 1/4 on these nights for 37.5mg instead. Klonopin 0.5mg as needed: takes on weekends, Sat and Sunday  Mood: "calm, mellow" Anxiety: worse at night and weekends Panic attacks: endorses, on weekends Motivation: low Anhedonia: endorses Appetite: ok Sleep: poor, hard to fall asleep Energy: low Focus: low Guilt/worthlessness: endorses, feels like burden on his wife Thoughts of death: passive thoughts of death without intent or plan. Does not want to kill himself. Thoughts of harming self: denies Thoughts of harming others: denies  EtOH use: drinking 2 drinks 3-4 nights a week Drug use: denies Tobacco use: denies Caffeine use: less than 1 cup of decaff a day, almost stopped

## 2025-03-27 NOTE — PLAN
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  On initial assessment, the differential includes MDD, GRETA with panic attacks, r/o PTSD. Plan to increase Pristiq to 100mg daily, continue trazodone 150mg at bedtime, Klonopin 0.5mg daily as needed (takes 1-2 days per week). Offered therapy referral resources.  On follow up assessments: 11/4/2024: Partial improvement in depressive symptoms with Pristiq increase, plan to increase to 150mg daily. Continue Klonopin 0.5mg daily as needed, trazodone taking 75mg at bedtime lately (half of 150mg). Decreased caffeine and EtOH use during the interval. 11/19/2024: discussed possibility to explore an ADHD diagnosis at the next visit. Ongoing depressive symptoms and anxiety that is worse at night, plan to continue Pristiq to 150mg daily (pt declined a dose increase at this time since he wants to give it longer on this dose), continue trazodone 75-150mg nightly, Klonopin 0.5mg as needed. 2/27/2025: Pt's depression and anxiety are significant, suspect that depressive symptoms are contributing to poor focus rather than apparent ADHD though did not formally assess for ADHD on this visit. Pt is motivated for change and interested in therapy so writer offered an internal therapy referral. For now plan to continue Pristiq 150mg daily, trazodone 75mg at bedtime (can take 37.5mg as 1/4 of 150mg pill if coming home late), Klonopin 0.5mg as needed taking on weekends. MI provided on EtOH use cessation to improve sleep quality and mood. 3/27/2025: Pt has had trouble connecting with a therapist, writer discussed the possibility of pt reaching out to connect with the [facility] CBT practice. Continue Pristiq 150mg daily, trazodone 75mg at bedtime (can take 37.5mg as 1/4 of 150mg pill if coming home late), Klonopin 0.5mg as needed taking on weekends. Pt said that he would schedule a 30 min appt with writer to explore a possible ADHD diagnosis at the next visit.  Labs: 9/25/2023: A1c 5.7% H, vit B12 serum 361 wnl, chol 223 H, trig 130 wnl, HDL 69 wnl, LDL calc 131 H, TSH 1.20 wnl 4/2/2024: CMP wnl, TSH 1.08 wnl 6/10/2024: CBC wnl, CMP wnl  PLAN -Discussed the diagnosis, treatment, alternatives to recommended treatment, risk Vs benefits of treatment and no treatment and alternative treatments. -Lab/other tests: advised to continue to check annual bloodwork with PCP -Medication: continue Pristiq to 150mg daily, continue trazodone 37.5mg-150mg nightly, Klonopin 0.5mg as needed.  Desvenlafaxine side effects including but not limited to GI side effects, dry mouth, constipation, sweating, increased BP, urinary retention, sedation, headaches, dizziness, serotonin syndrome, hyponatremia, QT prolongation, weight gain, decreased libido, and black box warning of SI in patients younger than 24 were discussed. Trazodone side effects including but not limited to sedation, dry mouth constipation, headache, serotonin syndrome, black box warning increased suicidal thinking, prolonged QT interval, orthostatic hypotension and priapism discussed. Klonopin side effects: Discussed with patient adverse effects of longer term/frequent use of BZD, potential to develop tolerance to the dose effects and develop dependence, and potential for addiction. Advise patient not to drive or operate heavy machinery immediately after taking the medication. Also educated patient about safe use/and keeping meds safely, and to not share medications with family/friends. Advised not to combine BZD with EtOH. -Discussed recommendation for aerobic exercise -Discussed sleep hygiene -Educated patient of importance of remaining abstinent from drugs and alcohol. -Emergency procedures were discussed: pt. educated to call 911 or go to nearest ER for worsening of symptoms/suicidal/homicidal ideation. -Referred for individual psychotherapy to learn coping skills -Return to clinic in 2-4 weeks or earlier as needed -Patient given opportunity to ask questions and their questions were answered and they expressed understanding and agreement with above plan.  ISTOP Reference #: [id number]  Practitioner Count: 2 Pharmacy Count: 1 Current Opioid Prescriptions: 0 Current Benzodiazepine Prescriptions: 0 Current Stimulant Prescriptions: 0   Patient Demographic Information (PDI)     PDI	First Name	Last Name	Birth Date	Gender	Street Address	[facility]	Zip Code [place]	03/30/1987	Male	[address]	[facility]	[address]  Prescription Information    PDI Filter:   PDI	My Rx	Current Rx	Drug Type	Rx Written	Rx Dispensed	Drug	Quantity	Days Supply	Prescriber Name	Prescriber JAMES #	Payment Method	Dispenser A	N	N	O	02/26/2025	02/27/2025	oxycodone-acetaminophen 5-325 mg tab	5	2	Rommel Chucky H	[id number]	Cash	[id number] A	N	N	O	02/07/2025	02/07/2025	oxycodone hcl (ir) 5 mg tablet	36	5	Shannan Lockett	[id number]	Cash	[id number] A	N	N	O	12/17/2024	12/18/2024	oxycodone-acetaminophen 5-325 mg tab	28	7	MEMO Tay, Jaspal CONNER	[id number]	Insurance	[id number] A	Y	N	B	12/03/2024	12/03/2024	clonazepam 0.5 mg tablet	30	30	Abdirashid Herrera	[id number]	Insurance	[id number] A	N	N	O	07/09/2024	07/10/2024	tramadol hcl 50 mg tablet	16	4	MEMO Tay Juan C	[id number]	Insurance	[id number] A	N	N	O	04/16/2024	04/17/2024	tramadol hcl 50 mg tablet	15	3	MEMO Tay Juan C	[id number]	Insurance	[id number] A	N	N	B	03/21/2024	04/02/2024	alprazolam 0.25 mg tablet	30	15	Mabel Fountain	[id number]	Insurance	[id number]

## 2025-03-27 NOTE — PHYSICAL EXAM
[None] : none [Cooperative] : cooperative [Euthymic] : euthymic [Full] : full [Clear] : clear [Linear/Goal Directed] : linear/goal directed [Average] : average [WNL] : within normal limits [de-identified] : less dysphoric, congruent with stated mood [FreeTextEntry7] : passive thoughts of death without intent or plan, denies non-suicidal self-injurious ideation/intent/plan or homicidal ideation/intent/plan

## 2025-03-27 NOTE — PLAN
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  On initial assessment, the differential includes MDD, GRETA with panic attacks, r/o PTSD. Plan to increase Pristiq to 100mg daily, continue trazodone 150mg at bedtime, Klonopin 0.5mg daily as needed (takes 1-2 days per week). Offered therapy referral resources.  On follow up assessments: 11/4/2024: Partial improvement in depressive symptoms with Pristiq increase, plan to increase to 150mg daily. Continue Klonopin 0.5mg daily as needed, trazodone taking 75mg at bedtime lately (half of 150mg). Decreased caffeine and EtOH use during the interval. 11/19/2024: discussed possibility to explore an ADHD diagnosis at the next visit. Ongoing depressive symptoms and anxiety that is worse at night, plan to continue Pristiq to 150mg daily (pt declined a dose increase at this time since he wants to give it longer on this dose), continue trazodone 75-150mg nightly, Klonopin 0.5mg as needed. 2/27/2025: Pt's depression and anxiety are significant, suspect that depressive symptoms are contributing to poor focus rather than apparent ADHD though did not formally assess for ADHD on this visit. Pt is motivated for change and interested in therapy so writer offered an internal therapy referral. For now plan to continue Pristiq 150mg daily, trazodone 75mg at bedtime (can take 37.5mg as 1/4 of 150mg pill if coming home late), Klonopin 0.5mg as needed taking on weekends. MI provided on EtOH use cessation to improve sleep quality and mood. 3/27/2025: Pt has had trouble connecting with a therapist, writer discussed the possibility of pt reaching out to connect with the [facility] CBT practice. Continue Pristiq 150mg daily, trazodone 75mg at bedtime (can take 37.5mg as 1/4 of 150mg pill if coming home late), Klonopin 0.5mg as needed taking on weekends. Pt said that he would schedule a 30 min appt with writer to explore a possible ADHD diagnosis at the next visit.  Labs: 9/25/2023: A1c 5.7% H, vit B12 serum 361 wnl, chol 223 H, trig 130 wnl, HDL 69 wnl, LDL calc 131 H, TSH 1.20 wnl 4/2/2024: CMP wnl, TSH 1.08 wnl 6/10/2024: CBC wnl, CMP wnl  PLAN -Discussed the diagnosis, treatment, alternatives to recommended treatment, risk Vs benefits of treatment and no treatment and alternative treatments. -Lab/other tests: advised to continue to check annual bloodwork with PCP -Medication: continue Pristiq to 150mg daily, continue trazodone 37.5mg-150mg nightly, Klonopin 0.5mg as needed.  Desvenlafaxine side effects including but not limited to GI side effects, dry mouth, constipation, sweating, increased BP, urinary retention, sedation, headaches, dizziness, serotonin syndrome, hyponatremia, QT prolongation, weight gain, decreased libido, and black box warning of SI in patients younger than 24 were discussed. Trazodone side effects including but not limited to sedation, dry mouth constipation, headache, serotonin syndrome, black box warning increased suicidal thinking, prolonged QT interval, orthostatic hypotension and priapism discussed. Klonopin side effects: Discussed with patient adverse effects of longer term/frequent use of BZD, potential to develop tolerance to the dose effects and develop dependence, and potential for addiction. Advise patient not to drive or operate heavy machinery immediately after taking the medication. Also educated patient about safe use/and keeping meds safely, and to not share medications with family/friends. Advised not to combine BZD with EtOH. -Discussed recommendation for aerobic exercise -Discussed sleep hygiene -Educated patient of importance of remaining abstinent from drugs and alcohol. -Emergency procedures were discussed: pt. educated to call 911 or go to nearest ER for worsening of symptoms/suicidal/homicidal ideation. -Referred for individual psychotherapy to learn coping skills -Return to clinic in 2-4 weeks or earlier as needed -Patient given opportunity to ask questions and their questions were answered and they expressed understanding and agreement with above plan.  ISTOP Reference #: [id number]  Practitioner Count: 2 Pharmacy Count: 1 Current Opioid Prescriptions: 0 Current Benzodiazepine Prescriptions: 0 Current Stimulant Prescriptions: 0   Patient Demographic Information (PDI)     PDI	First Name	Last Name	Birth Date	Gender	Street Address	[facility]	Zip Code [place]	03/30/1987	Male	[address]	[facility]	[address]  Prescription Information    PDI Filter:   PDI	My Rx	Current Rx	Drug Type	Rx Written	Rx Dispensed	Drug	Quantity	Days Supply	Prescriber Name	Prescriber JAMES #	Payment Method	Dispenser A	N	N	O	02/26/2025	02/27/2025	oxycodone-acetaminophen 5-325 mg tab	5	2	Rommel Chucky H	[id number]	Cash	[id number] A	N	N	O	02/07/2025	02/07/2025	oxycodone hcl (ir) 5 mg tablet	36	5	Shannan Lockett	[id number]	Cash	[id number] A	N	N	O	12/17/2024	12/18/2024	oxycodone-acetaminophen 5-325 mg tab	28	7	MEOM Tay, Jaspal CONNER	[id number]	Insurance	[id number] A	Y	N	B	12/03/2024	12/03/2024	clonazepam 0.5 mg tablet	30	30	Abdirashid Herrera	[id number]	Insurance	[id number] A	N	N	O	07/09/2024	07/10/2024	tramadol hcl 50 mg tablet	16	4	MEMO Tay Juan C	[id number]	Insurance	[id number] A	N	N	O	04/16/2024	04/17/2024	tramadol hcl 50 mg tablet	15	3	MEMO Tay Juan C	[id number]	Insurance	[id number] A	N	N	B	03/21/2024	04/02/2024	alprazolam 0.25 mg tablet	30	15	Mabel Fountain	[id number]	Insurance	[id number]

## 2025-03-27 NOTE — REASON FOR VISIT
[Patient preference] : as per patient preference [Continuing, patient seen in-person within last 12 months] : Telehealth services are continuing as patient has been seen in-person within last 12 months. [Telehealth (audio & video) - Individual/Group] : This visit was provided via telehealth using real-time 2-way audio visual technology. [Medical Office: ([facility])___] : The provider was located at the medical office in [unfilled]. [Home] : The patient, [unfilled], was located at home, [unfilled], at the time of the visit. [Participant(s) identity verified] : Participant(s) identity verified. [Verbal consent obtained from patient/other participant(s)] : Verbal consent for telehealth/telephonic services obtained from patient/other participant(s) [Patient] : Patient [Other Location: e.g. Home (Enter Location, City,State)___] : The patient, [unfilled], was located at [unfilled] at the time of the visit. [FreeTextEntry4] : 9:30am

## 2025-03-27 NOTE — REASON FOR VISIT
[Patient preference] : as per patient preference [Continuing, patient seen in-person within last 12 months] : Telehealth services are continuing as patient has been seen in-person within last 12 months. [Telehealth (audio & video) - Individual/Group] : This visit was provided via telehealth using real-time 2-way audio visual technology. [Medical Office: ([facility])___] : The provider was located at the medical office in [unfilled]. [Home] : The patient, [unfilled], was located at home, [unfilled], at the time of the visit. [Participant(s) identity verified] : Participant(s) identity verified. [Verbal consent obtained from patient/other participant(s)] : Verbal consent for telehealth/telephonic services obtained from patient/other participant(s) [Patient] : Patient [Other Location: e.g. Home (Enter Location, City,State)___] : The patient, [unfilled], was located at [unfilled] at the time of the visit. [FreeTextEntry4] : 9:30am

## 2025-03-27 NOTE — HISTORY OF PRESENT ILLNESS
[FreeTextEntry1] : Social: Pt is still looking for a therapist. He wants to work on depression and anxiety. He is working in [employer] right now. Working on being home more often. Pt's 3 year old son had aggressive behavior at school so pt now has stress of maybe needing to change schools for his son. Pt can now go back to the gym. ISTOP reviewed.   Medications: Pristiq 150mg: endorses adherence, no side effects elicited  Trazodone 37.5mg (half of 75mg): taking 37.5mg-75mg, feels it helps Klonopin 0.5mg as needed: taking Sat/Sundays on weekends  Mood: "good" Anxiety: endorses Panic attacks: a couple last week Motivation: improving Anhedonia: denies Appetite: ok Sleep: "better" Energy: could be better Focus: could be better Guilt/worthlessness: endorses guilt Thoughts of death: ongoing passive thoughts of death without intent or plan Thoughts of harming self: denies Thoughts of harming others: denies  EtOH use: 2 beers 3-4 nights per week, feels he is able to decrease Drug use: denies Tobacco use: denies Caffeine use: 1-2 times a week has cain

## 2025-03-27 NOTE — PHYSICAL EXAM
[None] : none [Cooperative] : cooperative [Euthymic] : euthymic [Full] : full [Clear] : clear [Linear/Goal Directed] : linear/goal directed [Average] : average [WNL] : within normal limits [de-identified] : less dysphoric, congruent with stated mood [FreeTextEntry7] : passive thoughts of death without intent or plan, denies non-suicidal self-injurious ideation/intent/plan or homicidal ideation/intent/plan

## 2025-03-27 NOTE — HISTORY OF PRESENT ILLNESS
[FreeTextEntry1] : Social: Pt is still looking for a therapist. He wants to work on depression and anxiety. He is working in [employer] right now. Working on being home more often. Pt's 3 year old son had aggressive behavior at school so pt now has stress of maybe needing to change schools for his son. Pt can now go back to the gym. ISTOP reviewed.   Medications: Pristiq 150mg: endorses adherence, no side effects elicited  Trazodone 37.5mg (half of 75mg): taking 37.5mg-75mg, feels it helps Klonopin 0.5mg as needed: taking Sat/Sundays on weekends  Mood: "good" Anxiety: endorses Panic attacks: a couple last week Motivation: improving Anhedonia: denies Appetite: ok Sleep: "better" Energy: could be better Focus: could be better Guilt/worthlessness: endorses guilt Thoughts of death: ongoing passive thoughts of death without intent or plan Thoughts of harming self: denies Thoughts of harming others: denies  EtOH use: 2 beers 3-4 nights per week, feels he is able to decrease Drug use: denies Tobacco use: denies Caffeine use: 1-2 times a week has cain

## 2025-04-10 NOTE — PHYSICAL EXAM
[None] : none [Cooperative] : cooperative [Euthymic] : euthymic [Full] : full [Clear] : clear [Linear/Goal Directed] : linear/goal directed [Average] : average [WNL] : within normal limits [de-identified] : less dysphoric, congruent with stated mood [FreeTextEntry7] : denies current thoughts of death, denies non-suicidal self-injurious ideation/intent/plan or homicidal ideation/intent/plan

## 2025-04-10 NOTE — HISTORY OF PRESENT ILLNESS
Pt refused HS Lantus, states that when he takes lantus he has been dropping really low in the 30s or 40s. Education on purpose of lantus and informing MD so that dose can be accurate. MD notified. Will continue to monitor care. [FreeTextEntry1] : Social: Work is busy, is a good distraction, likes this. Stress of wife hoarding, clutter. ISTOP reviewed. CBT Practice: Pt is still looking to connect with a therapist.  Medications: Pristiq 150mg: endorses adherence, no side effects elicited, feels that he is doing better. Trazodone 37.5mg-75mg: Friday to Sunday takes 75mg, on workdays takes 37.5mg. Klonopin 0.5mg as needed: taking Saturdays/Sundays, takes in the mornings on weekends Discussed option to start a non-stimulant vs stimulant medication, pt expressed a preference to start a non-stimulant medication. Plan to start atomoxetine 40mg daily for ADHD.  Mood: "better", can feel more down as he approaches the time he needs to go home Anxiety: improving, worsens right before he needs to go home or mornings on weekends. Panic attacks: on weekends, once a week or so. Motivation: fair Anhedonia: denies Appetite: good Sleep: "better" Energy: good, could be better Focus: could be better Guilt/worthlessness: reducing Thoughts of death: once a week when having panic attacks has passive thoughts of death without intent or plan , denies currently Thoughts of harming self: denies Thoughts of harming others: denies  EtOH use: 2 beers 3-4 nights per week, feels he is able to decrease Drug use: denies Tobacco use: denies Caffeine use: 1-2 times a week has decaf.  Prior ADHD diagnosis: no prior diagnosis, pt's parents were "old school" and did not consider ADHD.  Symptoms of ADHD prior to age 12: Inattention- Careless mistakes or lack of close attention to details: endorses Difficulty sustaining attention: endorses Not listen when spoken to directly: endorses Does not follow through on instructions: endorses Difficulty organizing tasks and activities: endorses Avoids, dislikes or is reluctant to engage in tasks with sustained mental effort: endorses Loses necessary things for tasks: endorses Easily distracted: endorses Forgetful in daily activities: endorses  Hyperactivity- Often fidgets or taps hands or feet or squirms in seat. endorses Often leaves seat when remaining seated is expected. not sure Runs about or climbs when not appropriate. not sure Often unable to play or engage in leisure activities quietly. denies Often on the go, as if driven by a motor: endorses Talks excessively: endorses Blurts out answers before question completed: endorses Difficulty waiting their turn: endorses Interrupts others: endorses  Present in two or more settings: home and school  ASRS v1.1 Never  / Rarely  / Sometimes / Often  / Very Often   On items 1-3, 9, 12, 16, and 18 ratings of sometimes, often, or very often are assigned one point (ratings of never or rarely are assigned zero points). For the remaining 11 items, ratings of often or very often are assigned one point (ratings of never, rarely, or sometimes are assigned zero points.   Part A 1. How often do you have trouble wrapping up the final details of a project, once the challenging parts have been done? very often 1 2. How often do you have difficulty getting things in order when you have to do a task that requires organization? very often 1 3. How often do you have problems remembering appointments or obligations? very often 1 4. When you have a task that requires a lot of thought, how often do you avoid or delay getting started? very often 1 5. How often do you fidget or squirm with your hands or feet when you have to sit down for a long time? very often 1 6. How often do you feel overly active and compelled to do things, like you were driven by a motor? very often 1   TOTAL Part A: 6/6   Part B 7. How often do you make careless mistakes when you have to work on a boring or difficult project? often 1 8. How often do you have difficulty keeping your attention when you are doing boring or repetitive work? very often 1 9. How often do you have difficulty concentrating on what people say to you, even when they are speaking to you directly? very often 1 10. How often do you misplace or have difficulty finding things at home or at work? often 1 11. How often are you distracted by activity or noise around you? often 1 12. How often do you leave your seat in meetings or other situations in which you are expected to remain seated? sometimes 1 13. How often do you feel restless or fidgety? very often 1 14. How often do you have difficulty unwinding and relaxing when you have time to yourself? very often 1 15. How often do you find yourself talking too much when you are in social situations? often 1 16. When you are in a conversation, how often do you find yourself finishing the sentences of the people you are talking to, before they can finish them themselves? often 1 17. How often do you have difficulty waiting your turn in situations when turn taking is required? very often 1 18. How often do you interrupt others when they are busy? often 1   TOTAL: 18/18

## 2025-04-10 NOTE — HISTORY OF PRESENT ILLNESS
[FreeTextEntry1] : Social: Work is busy, is a good distraction, likes this. Stress of wife hoarding, clutter. ISTOP reviewed. CBT Practice: Pt is still looking to connect with a therapist.  Medications: Pristiq 150mg: endorses adherence, no side effects elicited, feels that he is doing better. Trazodone 37.5mg-75mg: Friday to Sunday takes 75mg, on workdays takes 37.5mg. Klonopin 0.5mg as needed: taking Saturdays/Sundays, takes in the mornings on weekends Discussed option to start a non-stimulant vs stimulant medication, pt expressed a preference to start a non-stimulant medication. Plan to start atomoxetine 40mg daily for ADHD.  Mood: "better", can feel more down as he approaches the time he needs to go home Anxiety: improving, worsens right before he needs to go home or mornings on weekends. Panic attacks: on weekends, once a week or so. Motivation: fair Anhedonia: denies Appetite: good Sleep: "better" Energy: good, could be better Focus: could be better Guilt/worthlessness: reducing Thoughts of death: once a week when having panic attacks has passive thoughts of death without intent or plan , denies currently Thoughts of harming self: denies Thoughts of harming others: denies  EtOH use: 2 beers 3-4 nights per week, feels he is able to decrease Drug use: denies Tobacco use: denies Caffeine use: 1-2 times a week has decaf.  Prior ADHD diagnosis: no prior diagnosis, pt's parents were "old school" and did not consider ADHD.  Symptoms of ADHD prior to age 12: Inattention- Careless mistakes or lack of close attention to details: endorses Difficulty sustaining attention: endorses Not listen when spoken to directly: endorses Does not follow through on instructions: endorses Difficulty organizing tasks and activities: endorses Avoids, dislikes or is reluctant to engage in tasks with sustained mental effort: endorses Loses necessary things for tasks: endorses Easily distracted: endorses Forgetful in daily activities: endorses  Hyperactivity- Often fidgets or taps hands or feet or squirms in seat. endorses Often leaves seat when remaining seated is expected. not sure Runs about or climbs when not appropriate. not sure Often unable to play or engage in leisure activities quietly. denies Often on the go, as if driven by a motor: endorses Talks excessively: endorses Blurts out answers before question completed: endorses Difficulty waiting their turn: endorses Interrupts others: endorses  Present in two or more settings: home and school  ASRS v1.1 Never  / Rarely  / Sometimes / Often  / Very Often   On items 1-3, 9, 12, 16, and 18 ratings of sometimes, often, or very often are assigned one point (ratings of never or rarely are assigned zero points). For the remaining 11 items, ratings of often or very often are assigned one point (ratings of never, rarely, or sometimes are assigned zero points.   Part A 1. How often do you have trouble wrapping up the final details of a project, once the challenging parts have been done? very often 1 2. How often do you have difficulty getting things in order when you have to do a task that requires organization? very often 1 3. How often do you have problems remembering appointments or obligations? very often 1 4. When you have a task that requires a lot of thought, how often do you avoid or delay getting started? very often 1 5. How often do you fidget or squirm with your hands or feet when you have to sit down for a long time? very often 1 6. How often do you feel overly active and compelled to do things, like you were driven by a motor? very often 1   TOTAL Part A: 6/6   Part B 7. How often do you make careless mistakes when you have to work on a boring or difficult project? often 1 8. How often do you have difficulty keeping your attention when you are doing boring or repetitive work? very often 1 9. How often do you have difficulty concentrating on what people say to you, even when they are speaking to you directly? very often 1 10. How often do you misplace or have difficulty finding things at home or at work? often 1 11. How often are you distracted by activity or noise around you? often 1 12. How often do you leave your seat in meetings or other situations in which you are expected to remain seated? sometimes 1 13. How often do you feel restless or fidgety? very often 1 14. How often do you have difficulty unwinding and relaxing when you have time to yourself? very often 1 15. How often do you find yourself talking too much when you are in social situations? often 1 16. When you are in a conversation, how often do you find yourself finishing the sentences of the people you are talking to, before they can finish them themselves? often 1 17. How often do you have difficulty waiting your turn in situations when turn taking is required? very often 1 18. How often do you interrupt others when they are busy? often 1   TOTAL: 18/18

## 2025-04-10 NOTE — PLAN
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  On initial assessment, the differential includes MDD, GRETA with panic attacks, r/o PTSD. Plan to increase Pristiq to 100mg daily, continue trazodone 150mg at bedtime, Klonopin 0.5mg daily as needed (takes 1-2 days per week). Offered therapy referral resources.  On follow up assessments: 11/4/2024: Partial improvement in depressive symptoms with Pristiq increase, plan to increase to 150mg daily. Continue Klonopin 0.5mg daily as needed, trazodone taking 75mg at bedtime lately (half of 150mg). Decreased caffeine and EtOH use during the interval. 11/19/2024: discussed possibility to explore an ADHD diagnosis at the next visit. Ongoing depressive symptoms and anxiety that is worse at night, plan to continue Pristiq to 150mg daily (pt declined a dose increase at this time since he wants to give it longer on this dose), continue trazodone 75-150mg nightly, Klonopin 0.5mg as needed. 2/27/2025: Pt's depression and anxiety are significant, suspect that depressive symptoms are contributing to poor focus rather than apparent ADHD though did not formally assess for ADHD on this visit. Pt is motivated for change and interested in therapy so writer offered an internal therapy referral. For now plan to continue Pristiq 150mg daily, trazodone 75mg at bedtime (can take 37.5mg as 1/4 of 150mg pill if coming home late), Klonopin 0.5mg as needed taking on weekends. MI provided on EtOH use cessation to improve sleep quality and mood. 3/27/2025: Pt has had trouble connecting with a therapist, writer discussed the possibility of pt reaching out to connect with the [facility] CBT practice. Continue Pristiq 150mg daily, trazodone 75mg at bedtime (can take 37.5mg as 1/4 of 150mg pill if coming home late), Klonopin 0.5mg as needed taking on weekends. Pt said that he would schedule a 30 min appt with writer to explore a possible ADHD diagnosis at the next visit. 4/10/2025: ASRS PART A 6/6, TOTAL score 18/18. Discussed option to start a non-stimulant vs stimulant medication, pt expressed a preference to start a non-stimulant medication. Plan to start atomoxetine 40mg daily for ADHD. Continue Pristiq 150mg daily, trazodone 75mg at bedtime (can take 37.5mg as 1/4 of 150mg pill if coming home late), Klonopin 0.5mg as needed taking on weekends. Pt is still looking for a therapist. Depression and anxiety are slightly improved.   Labs: 9/25/2023: A1c 5.7% H, vit B12 serum 361 wnl, chol 223 H, trig 130 wnl, HDL 69 wnl, LDL calc 131 H, TSH 1.20 wnl 4/2/2024: CMP wnl, TSH 1.08 wnl 6/10/2024: CBC wnl, CMP wnl  PLAN -Discussed the diagnosis, treatment, alternatives to recommended treatment, risk Vs benefits of treatment and no treatment and alternative treatments. -Lab/other tests: advised to continue to check annual bloodwork with PCP -Medication: continue Pristiq to 150mg daily, continue trazodone 37.5mg-150mg nightly, Klonopin 0.5mg as needed. Start atomoxetine 40mg daily.   Atomoxetine risks and benefits including side effects discussed, including but not limited to sedation, fatigue, decreased appetite, rare priapism, increased HR, increased blood pressure, insomnia, dizziness, anxiety, agitation, aggression, dry mouth, constipation, nausea, vomiting, abdominal pain, dyspepsia, urinary hesitancy, urinary retention, dysmenorrhea, sweating, sexual dysfunction, orthostatic hypotension, rare severe liver damage, hypomania induction, rare activation of suicidal ideation.  Desvenlafaxine side effects including but not limited to GI side effects, dry mouth, constipation, sweating, increased BP, urinary retention, sedation, headaches, dizziness, serotonin syndrome, hyponatremia, QT prolongation, weight gain, decreased libido, and black box warning of SI in patients younger than 24 were discussed. Trazodone side effects including but not limited to sedation, dry mouth constipation, headache, serotonin syndrome, black box warning increased suicidal thinking, prolonged QT interval, orthostatic hypotension and priapism discussed. Klonopin side effects: Discussed with patient adverse effects of longer term/frequent use of BZD, potential to develop tolerance to the dose effects and develop dependence, and potential for addiction. Advise patient not to drive or operate heavy machinery immediately after taking the medication. Also educated patient about safe use/and keeping meds safely, and to not share medications with family/friends. Advised not to combine BZD with EtOH. -Discussed recommendation for aerobic exercise -Discussed sleep hygiene -Educated patient of importance of remaining abstinent from drugs and alcohol. -Emergency procedures were discussed: pt. educated to call 911 or go to nearest ER for worsening of symptoms/suicidal/homicidal ideation. -Referred for individual psychotherapy to learn coping skills -Return to clinic in 2-4 weeks or earlier as needed -Patient given opportunity to ask questions and their questions were answered and they expressed understanding and agreement with above plan.  ISTOP Reference #: [id number]  Practitioner Count: 2 Pharmacy Count: 1 Current Opioid Prescriptions: 0 Current Benzodiazepine Prescriptions: 0 Current Stimulant Prescriptions: 0   Patient Demographic Information (PDI)     PDI	First Name	Last Name	Birth Date	Gender	Street Address	[facility]	Zip Code [place]	03/30/1987	Male	[address]	[facility]	[address]  Prescription Information    PDI Filter:   PDI	My Rx	Current Rx	Drug Type	Rx Written	Rx Dispensed	Drug	Quantity	Days Supply	Prescriber Name	Prescriber JAMES #	Payment Method	Dispenser A	N	N	O	02/26/2025	02/27/2025	oxycodone-acetaminophen 5-325 mg tab	5	2	Chucky Carney	[id number]	[id number] A	N	N	O	02/07/2025	02/07/2025	oxycodone hcl (ir) 5 mg tablet	36	5	Shannan Lockett	[id number]	[id number] A	N	N	O	12/17/2024	12/18/2024	oxycodone-acetaminophen 5-325 mg tab	28	7	EMMO Tay, Jaspal CONNER	[id number]	[id number] A	Y	N	B	12/03/2024	12/03/2024	clonazepam 0.5 mg tablet	30	30	Abdirashid Herrera	[id number]	[id number] A	N	N	O	07/09/2024	07/10/2024	tramadol hcl 50 mg tablet	16	4	MEMO Tay, Jaspal CONNER	[id number]	[id number] A	N	N	O	04/16/2024	04/17/2024	tramadol hcl 50 mg tablet	15	3	MEMO Tay Juan C	[id number]	[id number]

## 2025-04-10 NOTE — PHYSICAL EXAM
[None] : none [Cooperative] : cooperative [Euthymic] : euthymic [Full] : full [Clear] : clear [Linear/Goal Directed] : linear/goal directed [Average] : average [WNL] : within normal limits [de-identified] : less dysphoric, congruent with stated mood [FreeTextEntry7] : denies current thoughts of death, denies non-suicidal self-injurious ideation/intent/plan or homicidal ideation/intent/plan

## 2025-04-10 NOTE — PLAN
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  On initial assessment, the differential includes MDD, GRETA with panic attacks, r/o PTSD. Plan to increase Pristiq to 100mg daily, continue trazodone 150mg at bedtime, Klonopin 0.5mg daily as needed (takes 1-2 days per week). Offered therapy referral resources.  On follow up assessments: 11/4/2024: Partial improvement in depressive symptoms with Pristiq increase, plan to increase to 150mg daily. Continue Klonopin 0.5mg daily as needed, trazodone taking 75mg at bedtime lately (half of 150mg). Decreased caffeine and EtOH use during the interval. 11/19/2024: discussed possibility to explore an ADHD diagnosis at the next visit. Ongoing depressive symptoms and anxiety that is worse at night, plan to continue Pristiq to 150mg daily (pt declined a dose increase at this time since he wants to give it longer on this dose), continue trazodone 75-150mg nightly, Klonopin 0.5mg as needed. 2/27/2025: Pt's depression and anxiety are significant, suspect that depressive symptoms are contributing to poor focus rather than apparent ADHD though did not formally assess for ADHD on this visit. Pt is motivated for change and interested in therapy so writer offered an internal therapy referral. For now plan to continue Pristiq 150mg daily, trazodone 75mg at bedtime (can take 37.5mg as 1/4 of 150mg pill if coming home late), Klonopin 0.5mg as needed taking on weekends. MI provided on EtOH use cessation to improve sleep quality and mood. 3/27/2025: Pt has had trouble connecting with a therapist, writer discussed the possibility of pt reaching out to connect with the [facility] CBT practice. Continue Pristiq 150mg daily, trazodone 75mg at bedtime (can take 37.5mg as 1/4 of 150mg pill if coming home late), Klonopin 0.5mg as needed taking on weekends. Pt said that he would schedule a 30 min appt with writer to explore a possible ADHD diagnosis at the next visit. 4/10/2025: ASRS PART A 6/6, TOTAL score 18/18. Discussed option to start a non-stimulant vs stimulant medication, pt expressed a preference to start a non-stimulant medication. Plan to start atomoxetine 40mg daily for ADHD. Continue Pristiq 150mg daily, trazodone 75mg at bedtime (can take 37.5mg as 1/4 of 150mg pill if coming home late), Klonopin 0.5mg as needed taking on weekends. Pt is still looking for a therapist. Depression and anxiety are slightly improved.   Labs: 9/25/2023: A1c 5.7% H, vit B12 serum 361 wnl, chol 223 H, trig 130 wnl, HDL 69 wnl, LDL calc 131 H, TSH 1.20 wnl 4/2/2024: CMP wnl, TSH 1.08 wnl 6/10/2024: CBC wnl, CMP wnl  PLAN -Discussed the diagnosis, treatment, alternatives to recommended treatment, risk Vs benefits of treatment and no treatment and alternative treatments. -Lab/other tests: advised to continue to check annual bloodwork with PCP -Medication: continue Pristiq to 150mg daily, continue trazodone 37.5mg-150mg nightly, Klonopin 0.5mg as needed. Start atomoxetine 40mg daily.   Atomoxetine risks and benefits including side effects discussed, including but not limited to sedation, fatigue, decreased appetite, rare priapism, increased HR, increased blood pressure, insomnia, dizziness, anxiety, agitation, aggression, dry mouth, constipation, nausea, vomiting, abdominal pain, dyspepsia, urinary hesitancy, urinary retention, dysmenorrhea, sweating, sexual dysfunction, orthostatic hypotension, rare severe liver damage, hypomania induction, rare activation of suicidal ideation.  Desvenlafaxine side effects including but not limited to GI side effects, dry mouth, constipation, sweating, increased BP, urinary retention, sedation, headaches, dizziness, serotonin syndrome, hyponatremia, QT prolongation, weight gain, decreased libido, and black box warning of SI in patients younger than 24 were discussed. Trazodone side effects including but not limited to sedation, dry mouth constipation, headache, serotonin syndrome, black box warning increased suicidal thinking, prolonged QT interval, orthostatic hypotension and priapism discussed. Klonopin side effects: Discussed with patient adverse effects of longer term/frequent use of BZD, potential to develop tolerance to the dose effects and develop dependence, and potential for addiction. Advise patient not to drive or operate heavy machinery immediately after taking the medication. Also educated patient about safe use/and keeping meds safely, and to not share medications with family/friends. Advised not to combine BZD with EtOH. -Discussed recommendation for aerobic exercise -Discussed sleep hygiene -Educated patient of importance of remaining abstinent from drugs and alcohol. -Emergency procedures were discussed: pt. educated to call 911 or go to nearest ER for worsening of symptoms/suicidal/homicidal ideation. -Referred for individual psychotherapy to learn coping skills -Return to clinic in 2-4 weeks or earlier as needed -Patient given opportunity to ask questions and their questions were answered and they expressed understanding and agreement with above plan.  ISTOP Reference #: [id number]  Practitioner Count: 2 Pharmacy Count: 1 Current Opioid Prescriptions: 0 Current Benzodiazepine Prescriptions: 0 Current Stimulant Prescriptions: 0   Patient Demographic Information (PDI)     PDI	First Name	Last Name	Birth Date	Gender	Street Address	[facility]	Zip Code [place]	03/30/1987	Male	[address]	[facility]	[address]  Prescription Information    PDI Filter:   PDI	My Rx	Current Rx	Drug Type	Rx Written	Rx Dispensed	Drug	Quantity	Days Supply	Prescriber Name	Prescriber JAMES #	Payment Method	Dispenser A	N	N	O	02/26/2025	02/27/2025	oxycodone-acetaminophen 5-325 mg tab	5	2	Chucky Carney	[id number]	[employer] #[id number] A	N	N	O	02/07/2025	02/07/2025	oxycodone hcl (ir) 5 mg tablet	36	5	Shannan Lockett	[id number]	[employer] #[id number] A	N	N	O	12/17/2024	12/18/2024	oxycodone-acetaminophen 5-325 mg tab	28	7	MEMO Tay, Jaspal CONNER	[id number]	[facility] #[id number] A	Y	N	B	12/03/2024	12/03/2024	clonazepam 0.5 mg tablet	30	30	Abdirashid Herrera	[id number]	[facility][id number] A	N	N	O	07/09/2024	07/10/2024	tramadol hcl 50 mg tablet	16	4	MEMO Tay, Jaspal CONNER	[id number]	[facility][id number] A	N	N	O	04/16/2024	04/17/2024	tramadol hcl 50 mg tablet	15	3	MEMO Tay Juan C	[id number]	[facility] #[id number]

## 2025-05-08 NOTE — PLAN
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  On initial assessment, the differential includes MDD, GRETA with panic attacks, r/o PTSD. Plan to increase Pristiq to 100mg daily, continue trazodone 150mg at bedtime, Klonopin 0.5mg daily as needed (takes 1-2 days per week). Offered therapy referral resources.  On follow up assessments: 11/4/2024: Partial improvement in depressive symptoms with Pristiq increase, plan to increase to 150mg daily. Continue Klonopin 0.5mg daily as needed, trazodone taking 75mg at bedtime lately (half of 150mg). Decreased caffeine and EtOH use during the interval. 11/19/2024: discussed possibility to explore an ADHD diagnosis at the next visit. Ongoing depressive symptoms and anxiety that is worse at night, plan to continue Pristiq to 150mg daily (pt declined a dose increase at this time since he wants to give it longer on this dose), continue trazodone 75-150mg nightly, Klonopin 0.5mg as needed. 2/27/2025: Pt's depression and anxiety are significant, suspect that depressive symptoms are contributing to poor focus rather than apparent ADHD though did not formally assess for ADHD on this visit. Pt is motivated for change and interested in therapy so writer offered an internal therapy referral. For now plan to continue Pristiq 150mg daily, trazodone 75mg at bedtime (can take 37.5mg as 1/4 of 150mg pill if coming home late), Klonopin 0.5mg as needed taking on weekends. MI provided on EtOH use cessation to improve sleep quality and mood. 3/27/2025: Pt has had trouble connecting with a therapist, writer discussed the possibility of pt reaching out to connect with the [facility] CBT practice. Continue Pristiq 150mg daily, trazodone 75mg at bedtime (can take 37.5mg as 1/4 of 150mg pill if coming home late), Klonopin 0.5mg as needed taking on weekends. Pt said that he would schedule a 30 min appt with writer to explore a possible ADHD diagnosis at the next visit. 4/10/2025: ASRS PART A 6/6, TOTAL score 18/18. Discussed option to start a non-stimulant vs stimulant medication, pt expressed a preference to start a non-stimulant medication. Plan to start atomoxetine 40mg daily for ADHD. Continue Pristiq 150mg daily, trazodone 75mg at bedtime (can take 37.5mg as 1/4 of 150mg pill if coming home late), Klonopin 0.5mg as needed taking on weekends. Pt is still looking for a therapist. Depression and anxiety are slightly improved. 5/8/2025:   Labs: 9/25/2023: A1c 5.7% H, vit B12 serum 361 wnl, chol 223 H, trig 130 wnl, HDL 69 wnl, LDL calc 131 H, TSH 1.20 wnl 4/2/2024: CMP wnl, TSH 1.08 wnl 6/10/2024: CBC wnl, CMP wnl  PLAN -Discussed the diagnosis, treatment, alternatives to recommended treatment, risk Vs benefits of treatment and no treatment and alternative treatments. -Lab/other tests: advised to continue to check annual bloodwork with PCP -Medication: continue Pristiq to 150mg daily, continue trazodone 37.5mg-150mg nightly, Klonopin 0.5mg as needed. Start atomoxetine 40mg daily.  Atomoxetine risks and benefits including side effects discussed, including but not limited to sedation, fatigue, decreased appetite, rare priapism, increased HR, increased blood pressure, insomnia, dizziness, anxiety, agitation, aggression, dry mouth, constipation, nausea, vomiting, abdominal pain, dyspepsia, urinary hesitancy, urinary retention, dysmenorrhea, sweating, sexual dysfunction, orthostatic hypotension, rare severe liver damage, hypomania induction, rare activation of suicidal ideation.  Desvenlafaxine side effects including but not limited to GI side effects, dry mouth, constipation, sweating, increased BP, urinary retention, sedation, headaches, dizziness, serotonin syndrome, hyponatremia, QT prolongation, weight gain, decreased libido, and black box warning of SI in patients younger than 24 were discussed. Trazodone side effects including but not limited to sedation, dry mouth constipation, headache, serotonin syndrome, black box warning increased suicidal thinking, prolonged QT interval, orthostatic hypotension and priapism discussed. Klonopin side effects: Discussed with patient adverse effects of longer term/frequent use of BZD, potential to develop tolerance to the dose effects and develop dependence, and potential for addiction. Advise patient not to drive or operate heavy machinery immediately after taking the medication. Also educated patient about safe use/and keeping meds safely, and to not share medications with family/friends. Advised not to combine BZD with EtOH. -Discussed recommendation for aerobic exercise -Discussed sleep hygiene -Educated patient of importance of remaining abstinent from drugs and alcohol. -Emergency procedures were discussed: pt. educated to call 911 or go to nearest ER for worsening of symptoms/suicidal/homicidal ideation. -Referred for individual psychotherapy to learn coping skills -Return to clinic in 2-4 weeks or earlier as needed -Patient given opportunity to ask questions and their questions were answered and they expressed understanding and agreement with above plan.  ISTOP Reference #: [id number]  Practitioner Count: 2 Pharmacy Count: 1 Current Opioid Prescriptions: 0 Current Benzodiazepine Prescriptions: 1 Current Stimulant Prescriptions: 0   Patient Demographic Information (PDI)     PDI	First Name	Last Name	Birth Date	Gender	Street Address	[facility]	Zip Code [place]	03/30/1987	Male	[address]	[facility]	[address]  Prescription Information    PDI Filter:   PDI	My Rx	Current Rx	Drug Type	Rx Written	Rx Dispensed	Drug	Quantity	Days Supply	Prescriber Name	Prescriber JAMES #	Payment Method	Dispenser A	Y	Y	B	04/10/2025	04/12/2025	clonazepam 0.5 mg tablet	30	30	Abdirashid Herrera	[id number]	Insurance	[employer] #[id number] A	N	N	O	02/26/2025	02/27/2025	oxycodone-acetaminophen 5-325 mg tab	5	2	Chucky Carney	[id number]	Cash	[employer] #[id number] A	N	N	O	02/07/2025	02/07/2025	oxycodone hcl (ir) 5 mg tablet	36	5	Debbie Lockettah	[id number]	[facility] #[id number] A	N	N	O	12/17/2024	12/18/2024	oxycodone-acetaminophen 5-325 mg tab	28	7	MEMO Tay, Jaspal CONNER	[id number]	[facility] #[id number] A	Y	N	B	12/03/2024	12/03/2024	clonazepam 0.5 mg tablet	30	30	Abdirashid Herrera	[id number]	[facility] #[id number] A	N	N	O	07/09/2024	07/10/2024	tramadol hcl 50 mg tablet	16	4	MEMO Tay Juan C	[id number]	[facility] #[id number]

## 2025-05-08 NOTE — HISTORY OF PRESENT ILLNESS
[FreeTextEntry1] : Social: ISTOP reviewed. CBT Practice:  Medications: Pristiq 150mg: Trazodone 37.5-75mg: Klonopin 0.5mg as needed: Atomoxetine 40mg:  Mood: Anxiety: Panic attacks: Motivation: Anhedonia: Appetite: Sleep: Energy: Focus: Guilt/worthlessness: Thoughts of death: Thoughts of harming self: Thoughts of harming others:  EtOH use: Drug use: Tobacco use: Caffeine use:

## 2025-05-08 NOTE — PLAN
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  On initial assessment, the differential includes MDD, GRETA with panic attacks, r/o PTSD. Plan to increase Pristiq to 100mg daily, continue trazodone 150mg at bedtime, Klonopin 0.5mg daily as needed (takes 1-2 days per week). Offered therapy referral resources.  On follow up assessments: 11/4/2024: Partial improvement in depressive symptoms with Pristiq increase, plan to increase to 150mg daily. Continue Klonopin 0.5mg daily as needed, trazodone taking 75mg at bedtime lately (half of 150mg). Decreased caffeine and EtOH use during the interval. 11/19/2024: discussed possibility to explore an ADHD diagnosis at the next visit. Ongoing depressive symptoms and anxiety that is worse at night, plan to continue Pristiq to 150mg daily (pt declined a dose increase at this time since he wants to give it longer on this dose), continue trazodone 75-150mg nightly, Klonopin 0.5mg as needed. 2/27/2025: Pt's depression and anxiety are significant, suspect that depressive symptoms are contributing to poor focus rather than apparent ADHD though did not formally assess for ADHD on this visit. Pt is motivated for change and interested in therapy so writer offered an internal therapy referral. For now plan to continue Pristiq 150mg daily, trazodone 75mg at bedtime (can take 37.5mg as 1/4 of 150mg pill if coming home late), Klonopin 0.5mg as needed taking on weekends. MI provided on EtOH use cessation to improve sleep quality and mood. 3/27/2025: Pt has had trouble connecting with a therapist, writer discussed the possibility of pt reaching out to connect with the [facility] CBT practice. Continue Pristiq 150mg daily, trazodone 75mg at bedtime (can take 37.5mg as 1/4 of 150mg pill if coming home late), Klonopin 0.5mg as needed taking on weekends. Pt said that he would schedule a 30 min appt with writer to explore a possible ADHD diagnosis at the next visit. 4/10/2025: ASRS PART A 6/6, TOTAL score 18/18. Discussed option to start a non-stimulant vs stimulant medication, pt expressed a preference to start a non-stimulant medication. Plan to start atomoxetine 40mg daily for ADHD. Continue Pristiq 150mg daily, trazodone 75mg at bedtime (can take 37.5mg as 1/4 of 150mg pill if coming home late), Klonopin 0.5mg as needed taking on weekends. Pt is still looking for a therapist. Depression and anxiety are slightly improved. 5/8/2025:   Labs: 9/25/2023: A1c 5.7% H, vit B12 serum 361 wnl, chol 223 H, trig 130 wnl, HDL 69 wnl, LDL calc 131 H, TSH 1.20 wnl 4/2/2024: CMP wnl, TSH 1.08 wnl 6/10/2024: CBC wnl, CMP wnl  PLAN -Discussed the diagnosis, treatment, alternatives to recommended treatment, risk Vs benefits of treatment and no treatment and alternative treatments. -Lab/other tests: advised to continue to check annual bloodwork with PCP -Medication: continue Pristiq to 150mg daily, continue trazodone 37.5mg-150mg nightly, Klonopin 0.5mg as needed. Start atomoxetine 40mg daily.  Atomoxetine risks and benefits including side effects discussed, including but not limited to sedation, fatigue, decreased appetite, rare priapism, increased HR, increased blood pressure, insomnia, dizziness, anxiety, agitation, aggression, dry mouth, constipation, nausea, vomiting, abdominal pain, dyspepsia, urinary hesitancy, urinary retention, dysmenorrhea, sweating, sexual dysfunction, orthostatic hypotension, rare severe liver damage, hypomania induction, rare activation of suicidal ideation.  Desvenlafaxine side effects including but not limited to GI side effects, dry mouth, constipation, sweating, increased BP, urinary retention, sedation, headaches, dizziness, serotonin syndrome, hyponatremia, QT prolongation, weight gain, decreased libido, and black box warning of SI in patients younger than 24 were discussed. Trazodone side effects including but not limited to sedation, dry mouth constipation, headache, serotonin syndrome, black box warning increased suicidal thinking, prolonged QT interval, orthostatic hypotension and priapism discussed. Klonopin side effects: Discussed with patient adverse effects of longer term/frequent use of BZD, potential to develop tolerance to the dose effects and develop dependence, and potential for addiction. Advise patient not to drive or operate heavy machinery immediately after taking the medication. Also educated patient about safe use/and keeping meds safely, and to not share medications with family/friends. Advised not to combine BZD with EtOH. -Discussed recommendation for aerobic exercise -Discussed sleep hygiene -Educated patient of importance of remaining abstinent from drugs and alcohol. -Emergency procedures were discussed: pt. educated to call 911 or go to nearest ER for worsening of symptoms/suicidal/homicidal ideation. -Referred for individual psychotherapy to learn coping skills -Return to clinic in 2-4 weeks or earlier as needed -Patient given opportunity to ask questions and their questions were answered and they expressed understanding and agreement with above plan.  ISTOP Reference #: [id number]  Practitioner Count: 2 Pharmacy Count: 1 Current Opioid Prescriptions: 0 Current Benzodiazepine Prescriptions: 1 Current Stimulant Prescriptions: 0   Patient Demographic Information (PDI)     PDI	First Name	Last Name	Birth Date	Gender	Street Address	[facility]	Zip Code [place]	03/30/1987	Male	[address]	[facility]	[address]  Prescription Information    PDI Filter:   PDI	My Rx	Current Rx	Drug Type	Rx Written	Rx Dispensed	Drug	Quantity	Days Supply	Prescriber Name	Prescriber JAMES #	Payment Method	Dispenser A	Y	Y	B	04/10/2025	04/12/2025	clonazepam 0.5 mg tablet	30	30	Abdirashid Herrera	[id number]	Insurance	[employer] #[id number] A	N	N	O	02/26/2025	02/27/2025	oxycodone-acetaminophen 5-325 mg tab	5	2	Chucky Carney	[id number]	Cash	[employer] #[id number] A	N	N	O	02/07/2025	02/07/2025	oxycodone hcl (ir) 5 mg tablet	36	5	Debbie Lockettah	[id number]	[facility] #[id number] A	N	N	O	12/17/2024	12/18/2024	oxycodone-acetaminophen 5-325 mg tab	28	7	MEMO Tay, Jaspal CONNER	[id number]	[facility] #[id number] A	Y	N	B	12/03/2024	12/03/2024	clonazepam 0.5 mg tablet	30	30	Abdirashid Herrera	[id number]	[facility] #[id number] A	N	N	O	07/09/2024	07/10/2024	tramadol hcl 50 mg tablet	16	4	MEMO Tay Juan C	[id number]	[facility] #[id number]

## 2025-05-08 NOTE — PHYSICAL EXAM
[None] : none [Cooperative] : cooperative [Euthymic] : euthymic [Full] : full [Clear] : clear [Linear/Goal Directed] : linear/goal directed [Average] : average [WNL] : within normal limits [de-identified] : less dysphoric, congruent with stated mood [FreeTextEntry7] : denies current thoughts of death, denies non-suicidal self-injurious ideation/intent/plan or homicidal ideation/intent/plan

## 2025-05-08 NOTE — PHYSICAL EXAM
[None] : none [Cooperative] : cooperative [Euthymic] : euthymic [Full] : full [Clear] : clear [Linear/Goal Directed] : linear/goal directed [Average] : average [WNL] : within normal limits [de-identified] : less dysphoric, congruent with stated mood [FreeTextEntry7] : denies current thoughts of death, denies non-suicidal self-injurious ideation/intent/plan or homicidal ideation/intent/plan

## 2025-05-27 NOTE — PLAN
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  On initial assessment, the differential includes MDD, GRETA with panic attacks, r/o PTSD. Plan to increase Pristiq to 100mg daily, continue trazodone 150mg at bedtime, Klonopin 0.5mg daily as needed (takes 1-2 days per week). Offered therapy referral resources.  On follow up assessments: 11/4/2024: Partial improvement in depressive symptoms with Pristiq increase, plan to increase to 150mg daily. Continue Klonopin 0.5mg daily as needed, trazodone taking 75mg at bedtime lately (half of 150mg). Decreased caffeine and EtOH use during the interval. 11/19/2024: discussed possibility to explore an ADHD diagnosis at the next visit. Ongoing depressive symptoms and anxiety that is worse at night, plan to continue Pristiq to 150mg daily (pt declined a dose increase at this time since he wants to give it longer on this dose), continue trazodone 75-150mg nightly, Klonopin 0.5mg as needed. 2/27/2025: Pt's depression and anxiety are significant, suspect that depressive symptoms are contributing to poor focus rather than apparent ADHD though did not formally assess for ADHD on this visit. Pt is motivated for change and interested in therapy so writer offered an internal therapy referral. For now plan to continue Pristiq 150mg daily, trazodone 75mg at bedtime (can take 37.5mg as 1/4 of 150mg pill if coming home late), Klonopin 0.5mg as needed taking on weekends. MI provided on EtOH use cessation to improve sleep quality and mood. 3/27/2025: Pt has had trouble connecting with a therapist, writer discussed the possibility of pt reaching out to connect with the [facility] CBT practice. Continue Pristiq 150mg daily, trazodone 75mg at bedtime (can take 37.5mg as 1/4 of 150mg pill if coming home late), Klonopin 0.5mg as needed taking on weekends. Pt said that he would schedule a 30 min appt with writer to explore a possible ADHD diagnosis at the next visit. 4/10/2025: ASRS PART A 6/6, TOTAL score 18/18. Discussed option to start a non-stimulant vs stimulant medication, pt expressed a preference to start a non-stimulant medication. Plan to start atomoxetine 40mg daily for ADHD. Continue Pristiq 150mg daily, trazodone 75mg at bedtime (can take 37.5mg as 1/4 of 150mg pill if coming home late), Klonopin 0.5mg as needed taking on weekends. Pt is still looking for a therapist. Depression and anxiety are slightly improved. 5/27/2025:   Labs: 9/25/2023: A1c 5.7% H, vit B12 serum 361 wnl, chol 223 H, trig 130 wnl, HDL 69 wnl, LDL calc 131 H, TSH 1.20 wnl 4/2/2024: CMP wnl, TSH 1.08 wnl 6/10/2024: CBC wnl, CMP wnl  PLAN -Discussed the diagnosis, treatment, alternatives to recommended treatment, risk Vs benefits of treatment and no treatment and alternative treatments. -Lab/other tests: advised to continue to check annual bloodwork with PCP -Medication: continue Pristiq to 150mg daily, continue trazodone 37.5mg-150mg nightly, Klonopin 0.5mg as needed. Start atomoxetine 40mg daily.  Atomoxetine risks and benefits including side effects discussed, including but not limited to sedation, fatigue, decreased appetite, rare priapism, increased HR, increased blood pressure, insomnia, dizziness, anxiety, agitation, aggression, dry mouth, constipation, nausea, vomiting, abdominal pain, dyspepsia, urinary hesitancy, urinary retention, dysmenorrhea, sweating, sexual dysfunction, orthostatic hypotension, rare severe liver damage, hypomania induction, rare activation of suicidal ideation.  Desvenlafaxine side effects including but not limited to GI side effects, dry mouth, constipation, sweating, increased BP, urinary retention, sedation, headaches, dizziness, serotonin syndrome, hyponatremia, QT prolongation, weight gain, decreased libido, and black box warning of SI in patients younger than 24 were discussed. Trazodone side effects including but not limited to sedation, dry mouth constipation, headache, serotonin syndrome, black box warning increased suicidal thinking, prolonged QT interval, orthostatic hypotension and priapism discussed. Klonopin side effects: Discussed with patient adverse effects of longer term/frequent use of BZD, potential to develop tolerance to the dose effects and develop dependence, and potential for addiction. Advise patient not to drive or operate heavy machinery immediately after taking the medication. Also educated patient about safe use/and keeping meds safely, and to not share medications with family/friends. Advised not to combine BZD with EtOH. -Discussed recommendation for aerobic exercise -Discussed sleep hygiene -Educated patient of importance of remaining abstinent from drugs and alcohol. -Emergency procedures were discussed: pt. educated to call 911 or go to nearest ER for worsening of symptoms/suicidal/homicidal ideation. -Referred for individual psychotherapy to learn coping skills -Return to clinic in 2-4 weeks or earlier as needed -Patient given opportunity to ask questions and their questions were answered and they expressed understanding and agreement with above plan.  ISTOP Reference #: [id number]  Practitioner Count: 2 Pharmacy Count: 1 Current Opioid Prescriptions: 0 Current Benzodiazepine Prescriptions: 0 Current Stimulant Prescriptions: 0   Patient Demographic Information (PDI)     PDI	First Name	Last Name	Birth Date	Gender	Street Address	[facility]	Zip Code [place]	03/30/1987	Male	[address]	[facility]	[id number]  Prescription Information    PDI Filter:   PDI	My Rx	Current Rx	Drug Type	Rx Written	Rx Dispensed	Drug	Quantity	Days Supply	Prescriber Name	Prescriber JAMES #	Payment Method	Dispenser A	Y	N	B	04/10/2025	04/12/2025	clonazepam 0.5 mg tablet	30	30	Abdirashid Herrera	[id number]	Insurance	[id number] A	N	N	O	02/26/2025	02/27/2025	oxycodone-acetaminophen 5-325 mg tab	5	2	Chucky Carney	[id number]	Cash	[facility] #[id number] A	N	N	O	02/07/2025	02/07/2025	oxycodone hcl (ir) 5 mg tablet	36	5	Debbie Lockettah	[id number]	[facility] #[id number] A	N	N	O	12/17/2024	12/18/2024	oxycodone-acetaminophen 5-325 mg tab	28	7	MEMO Tay, Jaspal CONNER	[id number]	[facility] #[id number] A	Y	N	B	12/03/2024	12/03/2024	clonazepam 0.5 mg tablet	30	30	Abdriashid Herrera	[id number]	[id number] A	N	N	O	07/09/2024	07/10/2024	tramadol hcl 50 mg tablet	16	4	MEMO Tay Juan C	[id number]	[facility] #[id number]

## 2025-05-27 NOTE — PLAN
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  On initial assessment, the differential includes MDD, GRETA with panic attacks, r/o PTSD. Plan to increase Pristiq to 100mg daily, continue trazodone 150mg at bedtime, Klonopin 0.5mg daily as needed (takes 1-2 days per week). Offered therapy referral resources.  On follow up assessments: 11/4/2024: Partial improvement in depressive symptoms with Pristiq increase, plan to increase to 150mg daily. Continue Klonopin 0.5mg daily as needed, trazodone taking 75mg at bedtime lately (half of 150mg). Decreased caffeine and EtOH use during the interval. 11/19/2024: discussed possibility to explore an ADHD diagnosis at the next visit. Ongoing depressive symptoms and anxiety that is worse at night, plan to continue Pristiq to 150mg daily (pt declined a dose increase at this time since he wants to give it longer on this dose), continue trazodone 75-150mg nightly, Klonopin 0.5mg as needed. 2/27/2025: Pt's depression and anxiety are significant, suspect that depressive symptoms are contributing to poor focus rather than apparent ADHD though did not formally assess for ADHD on this visit. Pt is motivated for change and interested in therapy so writer offered an internal therapy referral. For now plan to continue Pristiq 150mg daily, trazodone 75mg at bedtime (can take 37.5mg as 1/4 of 150mg pill if coming home late), Klonopin 0.5mg as needed taking on weekends. MI provided on EtOH use cessation to improve sleep quality and mood. 3/27/2025: Pt has had trouble connecting with a therapist, writer discussed the possibility of pt reaching out to connect with the [facility] CBT practice. Continue Pristiq 150mg daily, trazodone 75mg at bedtime (can take 37.5mg as 1/4 of 150mg pill if coming home late), Klonopin 0.5mg as needed taking on weekends. Pt said that he would schedule a 30 min appt with writer to explore a possible ADHD diagnosis at the next visit. 4/10/2025: ASRS PART A 6/6, TOTAL score 18/18. Discussed option to start a non-stimulant vs stimulant medication, pt expressed a preference to start a non-stimulant medication. Plan to start atomoxetine 40mg daily for ADHD. Continue Pristiq 150mg daily, trazodone 75mg at bedtime (can take 37.5mg as 1/4 of 150mg pill if coming home late), Klonopin 0.5mg as needed taking on weekends. Pt is still looking for a therapist. Depression and anxiety are slightly improved. 5/27/2025:   Labs: 9/25/2023: A1c 5.7% H, vit B12 serum 361 wnl, chol 223 H, trig 130 wnl, HDL 69 wnl, LDL calc 131 H, TSH 1.20 wnl 4/2/2024: CMP wnl, TSH 1.08 wnl 6/10/2024: CBC wnl, CMP wnl  PLAN -Discussed the diagnosis, treatment, alternatives to recommended treatment, risk Vs benefits of treatment and no treatment and alternative treatments. -Lab/other tests: advised to continue to check annual bloodwork with PCP -Medication: continue Pristiq to 150mg daily, continue trazodone 37.5mg-150mg nightly, Klonopin 0.5mg as needed. Start atomoxetine 40mg daily.  Atomoxetine risks and benefits including side effects discussed, including but not limited to sedation, fatigue, decreased appetite, rare priapism, increased HR, increased blood pressure, insomnia, dizziness, anxiety, agitation, aggression, dry mouth, constipation, nausea, vomiting, abdominal pain, dyspepsia, urinary hesitancy, urinary retention, dysmenorrhea, sweating, sexual dysfunction, orthostatic hypotension, rare severe liver damage, hypomania induction, rare activation of suicidal ideation.  Desvenlafaxine side effects including but not limited to GI side effects, dry mouth, constipation, sweating, increased BP, urinary retention, sedation, headaches, dizziness, serotonin syndrome, hyponatremia, QT prolongation, weight gain, decreased libido, and black box warning of SI in patients younger than 24 were discussed. Trazodone side effects including but not limited to sedation, dry mouth constipation, headache, serotonin syndrome, black box warning increased suicidal thinking, prolonged QT interval, orthostatic hypotension and priapism discussed. Klonopin side effects: Discussed with patient adverse effects of longer term/frequent use of BZD, potential to develop tolerance to the dose effects and develop dependence, and potential for addiction. Advise patient not to drive or operate heavy machinery immediately after taking the medication. Also educated patient about safe use/and keeping meds safely, and to not share medications with family/friends. Advised not to combine BZD with EtOH. -Discussed recommendation for aerobic exercise -Discussed sleep hygiene -Educated patient of importance of remaining abstinent from drugs and alcohol. -Emergency procedures were discussed: pt. educated to call 911 or go to nearest ER for worsening of symptoms/suicidal/homicidal ideation. -Referred for individual psychotherapy to learn coping skills -Return to clinic in 2-4 weeks or earlier as needed -Patient given opportunity to ask questions and their questions were answered and they expressed understanding and agreement with above plan.  ISTOP Reference #: [id number]  Practitioner Count: 2 Pharmacy Count: 1 Current Opioid Prescriptions: 0 Current Benzodiazepine Prescriptions: 0 Current Stimulant Prescriptions: 0   Patient Demographic Information (PDI)     PDI	First Name	Last Name	Birth Date	Gender	Street Address	[facility]	Zip Code [place]	03/30/1987	Male	[address]	[facility]	[id number]  Prescription Information    PDI Filter:   PDI	My Rx	Current Rx	Drug Type	Rx Written	Rx Dispensed	Drug	Quantity	Days Supply	Prescriber Name	Prescriber JAMES #	Payment Method	Dispenser A	Y	N	B	04/10/2025	04/12/2025	clonazepam 0.5 mg tablet	30	30	Abdirashid Herrera	[id number]	Insurance	[employer] [id number] A	N	N	O	02/26/2025	02/27/2025	oxycodone-acetaminophen 5-325 mg tab	5	2	Chucky Carney	[id number]	Cash	[id number] A	N	N	O	02/07/2025	02/07/2025	oxycodone hcl (ir) 5 mg tablet	36	5	Debbie Lockettah	[id number]	[facility] #[id number] A	N	N	O	12/17/2024	12/18/2024	oxycodone-acetaminophen 5-325 mg tab	28	7	MEMO Tay, Jaspal CONNER	[id number]	[facility] #[id number] A	Y	N	B	12/03/2024	12/03/2024	clonazepam 0.5 mg tablet	30	30	Abdirashid Herrera	[id number]	[id number] A	N	N	O	07/09/2024	07/10/2024	tramadol hcl 50 mg tablet	16	4	MEMO Tay Juan C	[id number]	[facility] #[id number]

## 2025-05-27 NOTE — REASON FOR VISIT
[Patient preference] : as per patient preference [Continuing, patient seen in-person within last 12 months] : Telehealth services are continuing as patient has been seen in-person within last 12 months. [Telehealth (audio & video) - Individual/Group] : This visit was provided via telehealth using real-time 2-way audio visual technology. [Other Location: e.g. Home (Enter Location, City,State)___] : The provider was located at [unfilled]. [Home] : The patient, [unfilled], was located at home, [unfilled], at the time of the visit. [Participant(s) identity verified] : Participant(s) identity verified. [Verbal consent obtained from patient/other participant(s)] : Verbal consent for telehealth/telephonic services obtained from patient/other participant(s) [Patient] : Patient [FreeTextEntry4] : 4:15pm

## 2025-06-12 NOTE — PLAN
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  On initial assessment, the differential includes MDD, GRETA with panic attacks, r/o PTSD. Plan to increase Pristiq to 100mg daily, continue trazodone 150mg at bedtime, Klonopin 0.5mg daily as needed (takes 1-2 days per week). Offered therapy referral resources.  On follow up assessments: 11/4/2024: Partial improvement in depressive symptoms with Pristiq increase, plan to increase to 150mg daily. Continue Klonopin 0.5mg daily as needed, trazodone taking 75mg at bedtime lately (half of 150mg). Decreased caffeine and EtOH use during the interval. 11/19/2024: discussed possibility to explore an ADHD diagnosis at the next visit. Ongoing depressive symptoms and anxiety that is worse at night, plan to continue Pristiq to 150mg daily (pt declined a dose increase at this time since he wants to give it longer on this dose), continue trazodone 75-150mg nightly, Klonopin 0.5mg as needed. 2/27/2025: Pt's depression and anxiety are significant, suspect that depressive symptoms are contributing to poor focus rather than apparent ADHD though did not formally assess for ADHD on this visit. Pt is motivated for change and interested in therapy so writer offered an internal therapy referral. For now plan to continue Pristiq 150mg daily, trazodone 75mg at bedtime (can take 37.5mg as 1/4 of 150mg pill if coming home late), Klonopin 0.5mg as needed taking on weekends. MI provided on EtOH use cessation to improve sleep quality and mood. 3/27/2025: Pt has had trouble connecting with a therapist, writer discussed the possibility of pt reaching out to connect with the [facility] CBT practice. Continue Pristiq 150mg daily, trazodone 75mg at bedtime (can take 37.5mg as 1/4 of 150mg pill if coming home late), Klonopin 0.5mg as needed taking on weekends. Pt said that he would schedule a 30 min appt with writer to explore a possible ADHD diagnosis at the next visit. 4/10/2025: ASRS PART A 6/6, TOTAL score 18/18. Discussed option to start a non-stimulant vs stimulant medication, pt expressed a preference to start a non-stimulant medication. Plan to start atomoxetine 40mg daily for ADHD. Continue Pristiq 150mg daily, trazodone 75mg at bedtime (can take 37.5mg as 1/4 of 150mg pill if coming home late), Klonopin 0.5mg as needed taking on weekends. Pt is still looking for a therapist. Depression and anxiety are slightly improved. 6/16/2025:   Labs: 9/25/2023: A1c 5.7% H, vit B12 serum 361 wnl, chol 223 H, trig 130 wnl, HDL 69 wnl, LDL calc 131 H, TSH 1.20 wnl 4/2/2024: CMP wnl, TSH 1.08 wnl 6/10/2024: CBC wnl, CMP wnl  PLAN -Discussed the diagnosis, treatment, alternatives to recommended treatment, risk Vs benefits of treatment and no treatment and alternative treatments. -Lab/other tests: advised to continue to check annual bloodwork with PCP -Medication: continue Pristiq to 150mg daily, continue trazodone 37.5mg-150mg nightly, Klonopin 0.5mg as needed. Start atomoxetine 40mg daily.  Atomoxetine risks and benefits including side effects discussed, including but not limited to sedation, fatigue, decreased appetite, rare priapism, increased HR, increased blood pressure, insomnia, dizziness, anxiety, agitation, aggression, dry mouth, constipation, nausea, vomiting, abdominal pain, dyspepsia, urinary hesitancy, urinary retention, dysmenorrhea, sweating, sexual dysfunction, orthostatic hypotension, rare severe liver damage, hypomania induction, rare activation of suicidal ideation.  Desvenlafaxine side effects including but not limited to GI side effects, dry mouth, constipation, sweating, increased BP, urinary retention, sedation, headaches, dizziness, serotonin syndrome, hyponatremia, QT prolongation, weight gain, decreased libido, and black box warning of SI in patients younger than 24 were discussed. Trazodone side effects including but not limited to sedation, dry mouth constipation, headache, serotonin syndrome, black box warning increased suicidal thinking, prolonged QT interval, orthostatic hypotension and priapism discussed. Klonopin side effects: Discussed with patient adverse effects of longer term/frequent use of BZD, potential to develop tolerance to the dose effects and develop dependence, and potential for addiction. Advise patient not to drive or operate heavy machinery immediately after taking the medication. Also educated patient about safe use/and keeping meds safely, and to not share medications with family/friends. Advised not to combine BZD with EtOH. -Discussed recommendation for aerobic exercise -Discussed sleep hygiene -Educated patient of importance of remaining abstinent from drugs and alcohol. -Emergency procedures were discussed: pt. educated to call 911 or go to nearest ER for worsening of symptoms/suicidal/homicidal ideation. -Referred for individual psychotherapy to learn coping skills -Return to clinic in 2-4 weeks or earlier as needed -Patient given opportunity to ask questions and their questions were answered and they expressed understanding and agreement with above plan.

## 2025-06-12 NOTE — REASON FOR VISIT
[Patient preference] : as per patient preference [Continuing, patient seen in-person within last 12 months] : Telehealth services are continuing as patient has been seen in-person within last 12 months. [Telehealth (audio & video) - Individual/Group] : This visit was provided via telehealth using real-time 2-way audio visual technology. [Medical Office: ([facility])___] : The provider was located at the medical office in [unfilled]. [Home] : The patient, [unfilled], was located at home, [unfilled], at the time of the visit. [Participant(s) identity verified] : Participant(s) identity verified. [Verbal consent obtained from patient/other participant(s)] : Verbal consent for telehealth/telephonic services obtained from patient/other participant(s) [FreeTextEntry4] : 3:45pm [Patient] : Patient

## 2025-06-12 NOTE — PLAN
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  On initial assessment, the differential includes MDD, GRETA with panic attacks, r/o PTSD. Plan to increase Pristiq to 100mg daily, continue trazodone 150mg at bedtime, Klonopin 0.5mg daily as needed (takes 1-2 days per week). Offered therapy referral resources.  On follow up assessments: 11/4/2024: Partial improvement in depressive symptoms with Pristiq increase, plan to increase to 150mg daily. Continue Klonopin 0.5mg daily as needed, trazodone taking 75mg at bedtime lately (half of 150mg). Decreased caffeine and EtOH use during the interval. 11/19/2024: discussed possibility to explore an ADHD diagnosis at the next visit. Ongoing depressive symptoms and anxiety that is worse at night, plan to continue Pristiq to 150mg daily (pt declined a dose increase at this time since he wants to give it longer on this dose), continue trazodone 75-150mg nightly, Klonopin 0.5mg as needed. 2/27/2025: Pt's depression and anxiety are significant, suspect that depressive symptoms are contributing to poor focus rather than apparent ADHD though did not formally assess for ADHD on this visit. Pt is motivated for change and interested in therapy so writer offered an internal therapy referral. For now plan to continue Pristiq 150mg daily, trazodone 75mg at bedtime (can take 37.5mg as 1/4 of 150mg pill if coming home late), Klonopin 0.5mg as needed taking on weekends. MI provided on EtOH use cessation to improve sleep quality and mood. 3/27/2025: Pt has had trouble connecting with a therapist, writer discussed the possibility of pt reaching out to connect with the [facility] practice. Continue Pristiq 150mg daily, trazodone 75mg at bedtime (can take 37.5mg as 1/4 of 150mg pill if coming home late), Klonopin 0.5mg as needed taking on weekends. Pt said that he would schedule a 30 min appt with writer to explore a possible ADHD diagnosis at the next visit. 4/10/2025: ASRS PART A 6/6, TOTAL score 18/18. Discussed option to start a non-stimulant vs stimulant medication, pt expressed a preference to start a non-stimulant medication. Plan to start atomoxetine 40mg daily for ADHD. Continue Pristiq 150mg daily, trazodone 75mg at bedtime (can take 37.5mg as 1/4 of 150mg pill if coming home late), Klonopin 0.5mg as needed taking on weekends. Pt is still looking for a therapist. Depression and anxiety are slightly improved. 6/16/2025:   Labs: 9/25/2023: A1c 5.7% H, vit B12 serum 361 wnl, chol 223 H, trig 130 wnl, HDL 69 wnl, LDL calc 131 H, TSH 1.20 wnl 4/2/2024: CMP wnl, TSH 1.08 wnl 6/10/2024: CBC wnl, CMP wnl  PLAN -Discussed the diagnosis, treatment, alternatives to recommended treatment, risk Vs benefits of treatment and no treatment and alternative treatments. -Lab/other tests: advised to continue to check annual bloodwork with PCP -Medication: continue Pristiq to 150mg daily, continue trazodone 37.5mg-150mg nightly, Klonopin 0.5mg as needed. Start atomoxetine 40mg daily.  Atomoxetine risks and benefits including side effects discussed, including but not limited to sedation, fatigue, decreased appetite, rare priapism, increased HR, increased blood pressure, insomnia, dizziness, anxiety, agitation, aggression, dry mouth, constipation, nausea, vomiting, abdominal pain, dyspepsia, urinary hesitancy, urinary retention, dysmenorrhea, sweating, sexual dysfunction, orthostatic hypotension, rare severe liver damage, hypomania induction, rare activation of suicidal ideation.  Desvenlafaxine side effects including but not limited to GI side effects, dry mouth, constipation, sweating, increased BP, urinary retention, sedation, headaches, dizziness, serotonin syndrome, hyponatremia, QT prolongation, weight gain, decreased libido, and black box warning of SI in patients younger than 24 were discussed. Trazodone side effects including but not limited to sedation, dry mouth constipation, headache, serotonin syndrome, black box warning increased suicidal thinking, prolonged QT interval, orthostatic hypotension and priapism discussed. Klonopin side effects: Discussed with patient adverse effects of longer term/frequent use of BZD, potential to develop tolerance to the dose effects and develop dependence, and potential for addiction. Advise patient not to drive or operate heavy machinery immediately after taking the medication. Also educated patient about safe use/and keeping meds safely, and to not share medications with family/friends. Advised not to combine BZD with EtOH. -Discussed recommendation for aerobic exercise -Discussed sleep hygiene -Educated patient of importance of remaining abstinent from drugs and alcohol. -Emergency procedures were discussed: pt. educated to call 911 or go to nearest ER for worsening of symptoms/suicidal/homicidal ideation. -Referred for individual psychotherapy to learn coping skills -Return to clinic in 2-4 weeks or earlier as needed -Patient given opportunity to ask questions and their questions were answered and they expressed understanding and agreement with above plan.

## 2025-06-12 NOTE — REASON FOR VISIT
[Patient preference] : as per patient preference [Continuing, patient seen in-person within last 12 months] : Telehealth services are continuing as patient has been seen in-person within last 12 months. [Telehealth (audio & video) - Individual/Group] : This visit was provided via telehealth using real-time 2-way audio visual technology. [Medical Office: ([facility])___] : The provider was located at the medical office in [unfilled]. [Home] : The patient, [unfilled], was located at home, [unfilled], at the time of the visit. [Participant(s) identity verified] : Participant(s) identity verified. [Verbal consent obtained from patient/other participant(s)] : Verbal consent for telehealth/telephonic services obtained from patient/other participant(s) [FreeTextEntry4] : 3:45pm [Patient] : Patient

## 2025-06-12 NOTE — HISTORY OF PRESENT ILLNESS
[FreeTextEntry1] : Social: [facility]:  Medications: Pristiq 150mg: Trazodone 37.5-75mg: Klonopin 0.5mg as needed: Atomoxetine 40mg:  Mood: Anxiety: Panic attacks: Motivation: Anhedonia: Appetite: Sleep: Energy: Focus: Guilt/worthlessness: Thoughts of death: Thoughts of harming self: Thoughts of harming others:  EtOH use: Drug use: Tobacco use: Caffeine use:

## 2025-06-23 NOTE — ASSESSMENT
[FreeTextEntry1] : //   h/o obesity s/p bariatric surgery, now with increased food cravings, would like o start weight loss meds as concerned about future weight gain.   -advised may stop zepbound given at target weight as agreed with patient  -A safe and healthy way to lose weight is to eat fewer calories and get regular exercise. You can lose up about 1 pound a week by decreasing the number of calories you eat by 500 calories each day. You can decrease calories by eating smaller portion sizes or by cutting out high-calorie foods. Read labels to find out how many calories are in the foods you eat. You can also burn calories with exercise such as walking, swimming, or biking. You will be more likely to keep weight off if you make these changes part of your lifestyle. -Exercise at least 30 minutes per day on most days of the week. Some examples of exercise include walking, biking, dancing, and swimming. You can also fit in more physical activity by taking the stairs instead of the elevator or parking farther away from stores.  Barretts Esophagus, s/p bariatric surgery -continue PPI  -f/u as per GI  -Don't overeat. Eat five or six small meals each day, instead of several large meals. Don't eat before bedtime. Allow 2 hours to digest your food before lying down. This allows time for the food to pass out of the stomach and into the small intestine, rather than having it back up into the esophagus. Lying down makes digestion difficult and makes heartburn more likely. -Several foods are known to lead to GERD include: Alcohol, particularly red wine, Black pepper, garlic, raw onions, and other spicy foods, chocolate, citrus fruits and products, such as mckenna, oranges and orange juice, coffee and caffeinated drinks, including tea and soda, peppermint, tomatoes -However, unless these foods are causing you heartburn you don't have to avoid them. -Several medications are known lead to GERD include-regular use of aspirin, NSAIDs include ibuprofen, naproxen, Celebrex, Sedatives, Narcotic painkillers, Progesterone, a hormone found in some birth control pills, iron and potassium.  Hyperlipidemia, LDL >100 -continue zocor  -Will check labs  -Advised decrease greasy, fatty foods, increase exercise, fiber intake  -Elevated cholesterol has been linked to increase in cardiovascular even such as heart attack, stroke, peripheral artery disease and death  vitamin b12 def  -continue supplements   vitamin d -continue supplements  Migraine -controlled  -imetrx prn   Anxiety/depression  -f/u as per psych  -continue desvenlafaxine, as directed by psych  -encouraged relaxation, tech, deep breathing exercises, yoga, acupuncture -follow-up with therapist as directed   Insomnia -continue trazadone as discussed  -Go to bed and get up at the same time every day-Do not try to force yourself to sleep. If you can't sleep, get out of bed and try again later. -Have coffee, tea, and other foods that have caffeine only in the morning -Avoid alcohol in the late afternoon, evening, and bedtime -Keep your bedroom dark, cool, quiet, and free of reminders of work or other things that cause you stress -Solve problems you have before you go to bed-Exercise several days a week, but not right before bed -Avoid looking at phones or reading devices ("e-books") that give off light before bed. This can make it harder to fall asleep.

## 2025-06-23 NOTE — HISTORY OF PRESENT ILLNESS
[de-identified] : 38 year old male with h/o hyperlipidemia, s/p bariatric surgery, Barett's Esoghatis, GERD, anxiety, insomnia presents for followup.  doing okay.  got laid off and will be losing his insurance.    diet- home cooking mostly.  no juices or energy drinks.  craved soda after the bariatric surgery.   exercise- 5x week, mostly elliptical.    , 2 children  employed  non-smoker

## 2025-06-23 NOTE — HEALTH RISK ASSESSMENT
[Fair] : ~his/her~ current health as fair  [Good] : ~his/her~  mood as  good [No] : In the past 12 months have you used drugs other than those required for medical reasons? No [No falls in past year] : Patient reported no falls in the past year [0] : 2) Feeling down, depressed, or hopeless: Not at all (0) [PHQ-2 Negative - No further assessment needed] : PHQ-2 Negative - No further assessment needed [Never] : Never [] :  [Fully functional (bathing, dressing, toileting, transferring, walking, feeding)] : Fully functional (bathing, dressing, toileting, transferring, walking, feeding) [Fully functional (using the telephone, shopping, preparing meals, housekeeping, doing laundry, using] : Fully functional and needs no help or supervision to perform IADLs (using the telephone, shopping, preparing meals, housekeeping, doing laundry, using transportation, managing medications and managing finances) [[id number]] : 0 [Change in mental status noted] : No change in mental status noted [Reports changes in hearing] : Reports no changes in hearing [Reports changes in vision] : Reports no changes in vision

## 2025-06-23 NOTE — HISTORY OF PRESENT ILLNESS
[de-identified] : 38 year old male with h/o hyperlipidemia, s/p bariatric surgery, Barett's Esoghatis, GERD, anxiety, insomnia presents for followup.  doing okay.  got laid off and will be losing his insurance.    diet- home cooking mostly.  no juices or energy drinks.  craved soda after the bariatric surgery.   exercise- 5x week, mostly elliptical.    , 2 children  employed  non-smoker

## 2025-06-23 NOTE — HEALTH RISK ASSESSMENT
[Fair] : ~his/her~ current health as fair  [Good] : ~his/her~  mood as  good [No] : In the past 12 months have you used drugs other than those required for medical reasons? No [No falls in past year] : Patient reported no falls in the past year [0] : 2) Feeling down, depressed, or hopeless: Not at all (0) [PHQ-2 Negative - No further assessment needed] : PHQ-2 Negative - No further assessment needed [Never] : Never [] :  [Fully functional (bathing, dressing, toileting, transferring, walking, feeding)] : Fully functional (bathing, dressing, toileting, transferring, walking, feeding) [Fully functional (using the telephone, shopping, preparing meals, housekeeping, doing laundry, using] : Fully functional and needs no help or supervision to perform IADLs (using the telephone, shopping, preparing meals, housekeeping, doing laundry, using transportation, managing medications and managing finances) [[id number]] : 0 [Change in mental status noted] : No change in mental status noted [Reports changes in hearing] : Reports no changes in hearing [Reports changes in vision] : Reports no changes in vision